# Patient Record
Sex: FEMALE | Race: WHITE | NOT HISPANIC OR LATINO | Employment: PART TIME | URBAN - METROPOLITAN AREA
[De-identification: names, ages, dates, MRNs, and addresses within clinical notes are randomized per-mention and may not be internally consistent; named-entity substitution may affect disease eponyms.]

---

## 2022-09-07 ENCOUNTER — HOSPITAL ENCOUNTER (EMERGENCY)
Facility: HOSPITAL | Age: 22
Discharge: HOME | End: 2022-09-07
Attending: STUDENT IN AN ORGANIZED HEALTH CARE EDUCATION/TRAINING PROGRAM | Admitting: STUDENT IN AN ORGANIZED HEALTH CARE EDUCATION/TRAINING PROGRAM

## 2022-09-07 VITALS
SYSTOLIC BLOOD PRESSURE: 127 MMHG | WEIGHT: 150 LBS | DIASTOLIC BLOOD PRESSURE: 81 MMHG | BODY MASS INDEX: 26.58 KG/M2 | HEART RATE: 95 BPM | RESPIRATION RATE: 14 BRPM | OXYGEN SATURATION: 98 % | HEIGHT: 63 IN | TEMPERATURE: 98.8 F

## 2022-09-07 DIAGNOSIS — M25.562 ACUTE PAIN OF LEFT KNEE: ICD-10-CM

## 2022-09-07 DIAGNOSIS — L02.416 ABSCESS OF SKIN OF LEFT KNEE: Primary | ICD-10-CM

## 2022-09-07 DIAGNOSIS — L03.116 CELLULITIS OF LEFT LOWER EXTREMITY: ICD-10-CM

## 2022-09-07 LAB
ANION GAP SERPL CALCULATED.3IONS-SCNC: 10 MMOL/L (ref 5–15)
BASOPHILS # BLD AUTO: 0.01 10*3/MM3 (ref 0–0.2)
BASOPHILS NFR BLD AUTO: 0.1 % (ref 0–1.5)
BUN SERPL-MCNC: 11 MG/DL (ref 6–20)
BUN/CREAT SERPL: 16.7 (ref 7–25)
CALCIUM SPEC-SCNC: 9.4 MG/DL (ref 8.6–10.5)
CHLORIDE SERPL-SCNC: 107 MMOL/L (ref 98–107)
CO2 SERPL-SCNC: 24 MMOL/L (ref 22–29)
CREAT SERPL-MCNC: 0.66 MG/DL (ref 0.57–1)
CRP SERPL-MCNC: 1.06 MG/DL (ref 0–0.5)
D-LACTATE SERPL-SCNC: 0.9 MMOL/L (ref 0.5–2)
DEPRECATED RDW RBC AUTO: 37.6 FL (ref 37–54)
EGFRCR SERPLBLD CKD-EPI 2021: 128.2 ML/MIN/1.73
EOSINOPHIL # BLD AUTO: 0.32 10*3/MM3 (ref 0–0.4)
EOSINOPHIL NFR BLD AUTO: 3.2 % (ref 0.3–6.2)
ERYTHROCYTE [DISTWIDTH] IN BLOOD BY AUTOMATED COUNT: 12.4 % (ref 12.3–15.4)
GLUCOSE SERPL-MCNC: 101 MG/DL (ref 65–99)
HCT VFR BLD AUTO: 37.9 % (ref 34–46.6)
HGB BLD-MCNC: 12.5 G/DL (ref 12–15.9)
IMM GRANULOCYTES # BLD AUTO: 0.04 10*3/MM3 (ref 0–0.05)
IMM GRANULOCYTES NFR BLD AUTO: 0.4 % (ref 0–0.5)
LYMPHOCYTES # BLD AUTO: 1.66 10*3/MM3 (ref 0.7–3.1)
LYMPHOCYTES NFR BLD AUTO: 16.7 % (ref 19.6–45.3)
MCH RBC QN AUTO: 27.5 PG (ref 26.6–33)
MCHC RBC AUTO-ENTMCNC: 33 G/DL (ref 31.5–35.7)
MCV RBC AUTO: 83.5 FL (ref 79–97)
MONOCYTES # BLD AUTO: 0.82 10*3/MM3 (ref 0.1–0.9)
MONOCYTES NFR BLD AUTO: 8.3 % (ref 5–12)
NEUTROPHILS NFR BLD AUTO: 7.07 10*3/MM3 (ref 1.7–7)
NEUTROPHILS NFR BLD AUTO: 71.3 % (ref 42.7–76)
NRBC BLD AUTO-RTO: 0 /100 WBC (ref 0–0.2)
PLATELET # BLD AUTO: 254 10*3/MM3 (ref 140–450)
PMV BLD AUTO: 11.4 FL (ref 6–12)
POTASSIUM SERPL-SCNC: 3.6 MMOL/L (ref 3.5–5.2)
RBC # BLD AUTO: 4.54 10*6/MM3 (ref 3.77–5.28)
SODIUM SERPL-SCNC: 141 MMOL/L (ref 136–145)
WBC NRBC COR # BLD: 9.92 10*3/MM3 (ref 3.4–10.8)

## 2022-09-07 PROCEDURE — 96365 THER/PROPH/DIAG IV INF INIT: CPT

## 2022-09-07 PROCEDURE — 87186 SC STD MICRODIL/AGAR DIL: CPT | Performed by: STUDENT IN AN ORGANIZED HEALTH CARE EDUCATION/TRAINING PROGRAM

## 2022-09-07 PROCEDURE — 87040 BLOOD CULTURE FOR BACTERIA: CPT | Performed by: STUDENT IN AN ORGANIZED HEALTH CARE EDUCATION/TRAINING PROGRAM

## 2022-09-07 PROCEDURE — 36415 COLL VENOUS BLD VENIPUNCTURE: CPT

## 2022-09-07 PROCEDURE — 25010000002 ONDANSETRON PER 1 MG: Performed by: STUDENT IN AN ORGANIZED HEALTH CARE EDUCATION/TRAINING PROGRAM

## 2022-09-07 PROCEDURE — 86140 C-REACTIVE PROTEIN: CPT | Performed by: STUDENT IN AN ORGANIZED HEALTH CARE EDUCATION/TRAINING PROGRAM

## 2022-09-07 PROCEDURE — 85025 COMPLETE CBC W/AUTO DIFF WBC: CPT | Performed by: STUDENT IN AN ORGANIZED HEALTH CARE EDUCATION/TRAINING PROGRAM

## 2022-09-07 PROCEDURE — 87147 CULTURE TYPE IMMUNOLOGIC: CPT | Performed by: STUDENT IN AN ORGANIZED HEALTH CARE EDUCATION/TRAINING PROGRAM

## 2022-09-07 PROCEDURE — 80048 BASIC METABOLIC PNL TOTAL CA: CPT | Performed by: STUDENT IN AN ORGANIZED HEALTH CARE EDUCATION/TRAINING PROGRAM

## 2022-09-07 PROCEDURE — 99283 EMERGENCY DEPT VISIT LOW MDM: CPT

## 2022-09-07 PROCEDURE — 87205 SMEAR GRAM STAIN: CPT | Performed by: STUDENT IN AN ORGANIZED HEALTH CARE EDUCATION/TRAINING PROGRAM

## 2022-09-07 PROCEDURE — 96375 TX/PRO/DX INJ NEW DRUG ADDON: CPT

## 2022-09-07 PROCEDURE — 87070 CULTURE OTHR SPECIMN AEROBIC: CPT | Performed by: STUDENT IN AN ORGANIZED HEALTH CARE EDUCATION/TRAINING PROGRAM

## 2022-09-07 PROCEDURE — 83605 ASSAY OF LACTIC ACID: CPT | Performed by: STUDENT IN AN ORGANIZED HEALTH CARE EDUCATION/TRAINING PROGRAM

## 2022-09-07 PROCEDURE — 25010000002 CEFTRIAXONE PER 250 MG: Performed by: STUDENT IN AN ORGANIZED HEALTH CARE EDUCATION/TRAINING PROGRAM

## 2022-09-07 RX ORDER — CLINDAMYCIN PHOSPHATE 600 MG/50ML
600 INJECTION INTRAVENOUS ONCE
Status: COMPLETED | OUTPATIENT
Start: 2022-09-07 | End: 2022-09-07

## 2022-09-07 RX ORDER — LIDOCAINE HYDROCHLORIDE 20 MG/ML
10 INJECTION, SOLUTION INFILTRATION; PERINEURAL ONCE
Status: COMPLETED | OUTPATIENT
Start: 2022-09-07 | End: 2022-09-07

## 2022-09-07 RX ORDER — CLINDAMYCIN HYDROCHLORIDE 300 MG/1
300 CAPSULE ORAL 4 TIMES DAILY
Qty: 28 CAPSULE | Refills: 0 | Status: SHIPPED | OUTPATIENT
Start: 2022-09-07 | End: 2022-09-14

## 2022-09-07 RX ORDER — ONDANSETRON 4 MG/1
4 TABLET, ORALLY DISINTEGRATING ORAL 4 TIMES DAILY PRN
Qty: 12 TABLET | Refills: 0 | Status: SHIPPED | OUTPATIENT
Start: 2022-09-07 | End: 2023-01-26

## 2022-09-07 RX ORDER — IBUPROFEN 600 MG/1
600 TABLET ORAL EVERY 6 HOURS PRN
Qty: 15 TABLET | Refills: 0 | OUTPATIENT
Start: 2022-09-07 | End: 2023-02-05

## 2022-09-07 RX ORDER — ONDANSETRON 2 MG/ML
4 INJECTION INTRAMUSCULAR; INTRAVENOUS ONCE
Status: COMPLETED | OUTPATIENT
Start: 2022-09-07 | End: 2022-09-07

## 2022-09-07 RX ORDER — IBUPROFEN 800 MG/1
800 TABLET ORAL ONCE
Status: COMPLETED | OUTPATIENT
Start: 2022-09-07 | End: 2022-09-07

## 2022-09-07 RX ADMIN — CLINDAMYCIN IN 5 PERCENT DEXTROSE 600 MG: 12 INJECTION, SOLUTION INTRAVENOUS at 06:53

## 2022-09-07 RX ADMIN — Medication 5 ML: at 06:18

## 2022-09-07 RX ADMIN — IBUPROFEN 800 MG: 800 TABLET, FILM COATED ORAL at 06:11

## 2022-09-07 RX ADMIN — SODIUM CHLORIDE 1 G: 900 INJECTION INTRAVENOUS at 06:16

## 2022-09-07 RX ADMIN — ONDANSETRON 4 MG: 2 INJECTION INTRAMUSCULAR; INTRAVENOUS at 06:33

## 2022-09-07 RX ADMIN — LIDOCAINE HYDROCHLORIDE 10 ML: 20 INJECTION, SOLUTION INFILTRATION; PERINEURAL at 06:57

## 2022-09-07 NOTE — DISCHARGE INSTRUCTIONS
Take the provided antibiotic as directed youshould start to see improvement in the redness in about 24 hours.  If you have worsening pain, fevers, spreading redness or any other concerning symptoms please return to the ED or seek other medical care.

## 2022-09-08 NOTE — ED PROVIDER NOTES
EMERGENCY DEPARTMENT ENCOUNTER    Pt Name: Yaneth Garcias  MRN: [id number]  Pt :   2000  Room Number:  1515  Date of encounter:  2022  PCP: Provider, No Known  ED Provider: Lyle Murphy MD    Historian: Patient      HPI:  Chief Complaint: Knee redness, spider bite        Context: Yaneth Garcias is a 21-year-old female who presents because of pain and drainage from left knee injury.  She says she first noticed symptoms about 24 hours ago and thinks she may have gotten bit by a spider.  Over the last 24 hours she has had worsening redness and tenderness over the area and is also noticed some purulent drainage.  She has been able to ambulate and denies any difficulty with flexing or extending her knee.  Denies fevers or systemic symptoms.  Currently rates her pain as moderate in its worse by palpating the area.  Pain has been progressively worsening with time.  Knows of no medications that makes it better.  Denies any other complaints at this time.    PAST MEDICAL HISTORY  No past medical history on file.      PAST SURGICAL HISTORY  No past surgical history on file.      FAMILY HISTORY  No family history on file.      SOCIAL HISTORY  Social History     Socioeconomic History   • Marital status: Single         ALLERGIES  Patient has no known allergies.        REVIEW OF SYSTEMS  Review of Systems       All systems reviewed and negative except for those discussed in HPI.       PHYSICAL EXAM    I have reviewed the triage vital signs and nursing notes.    ED Triage Vitals [22 0437]   Temp Heart Rate Resp BP SpO2   98.8 °F (37.1 °C) 95 14 127/81 98 %      Temp src Heart Rate Source Patient Position BP Location FiO2 (%)   Oral Monitor Sitting Left arm --       Physical Exam  GENERAL:   Appears awake and alert in no acute distress  HENT: Nares patent.  EYES: No scleral icterus.  CV: Regular rhythm, regular rate.  RESPIRATORY: Normal effort.  No audible wheezes, rales or rhonchi.  ABDOMEN: Soft,  nontender  MUSCULOSKELETAL: There is erythema overlying most of the patella with warmth and a small area of fluctuance just lateral to the midline appears to be a furuncle she is reporting possible spider bite.  Left knee has full range of passive motion and she is able to ambulate my concern for joint involvement is low.  NEURO: Alert, moves all extremities, follows commands.  SKIN: Warm, dry, no rash visualized.        LAB RESULTS  Recent Results (from the past 24 hour(s))   Basic Metabolic Panel    Collection Time: 09/07/22  5:26 AM    Specimen: Blood   Result Value Ref Range    Glucose 101 (H) 65 - 99 mg/dL    BUN 11 6 - 20 mg/dL    Creatinine 0.66 0.57 - 1.00 mg/dL    Sodium 141 136 - 145 mmol/L    Potassium 3.6 3.5 - 5.2 mmol/L    Chloride 107 98 - 107 mmol/L    CO2 24.0 22.0 - 29.0 mmol/L    Calcium 9.4 8.6 - 10.5 mg/dL    BUN/Creatinine Ratio 16.7 7.0 - 25.0    Anion Gap 10.0 5.0 - 15.0 mmol/L    eGFR 128.2 >60.0 mL/min/1.73   C-reactive Protein    Collection Time: 09/07/22  5:26 AM    Specimen: Blood   Result Value Ref Range    C-Reactive Protein 1.06 (H) 0.00 - 0.50 mg/dL   Lactic Acid, Plasma    Collection Time: 09/07/22  5:26 AM    Specimen: Blood   Result Value Ref Range    Lactate 0.9 0.5 - 2.0 mmol/L   CBC Auto Differential    Collection Time: 09/07/22  5:26 AM    Specimen: Blood   Result Value Ref Range    WBC 9.92 3.40 - 10.80 10*3/mm3    RBC 4.54 3.77 - 5.28 10*6/mm3    Hemoglobin 12.5 12.0 - 15.9 g/dL    Hematocrit 37.9 34.0 - 46.6 %    MCV 83.5 79.0 - 97.0 fL    MCH 27.5 26.6 - 33.0 pg    MCHC 33.0 31.5 - 35.7 g/dL    RDW 12.4 12.3 - 15.4 %    RDW-SD 37.6 37.0 - 54.0 fl    MPV 11.4 6.0 - 12.0 fL    Platelets 254 140 - 450 10*3/mm3    Neutrophil % 71.3 42.7 - 76.0 %    Lymphocyte % 16.7 (L) 19.6 - 45.3 %    Monocyte % 8.3 5.0 - 12.0 %    Eosinophil % 3.2 0.3 - 6.2 %    Basophil % 0.1 0.0 - 1.5 %    Immature Grans % 0.4 0.0 - 0.5 %    Neutrophils, Absolute 7.07 (H) 1.70 - 7.00 10*3/mm3     Lymphocytes, Absolute 1.66 0.70 - 3.10 10*3/mm3    Monocytes, Absolute 0.82 0.10 - 0.90 10*3/mm3    Eosinophils, Absolute 0.32 0.00 - 0.40 10*3/mm3    Basophils, Absolute 0.01 0.00 - 0.20 10*3/mm3    Immature Grans, Absolute 0.04 0.00 - 0.05 10*3/mm3    nRBC 0.0 0.0 - 0.2 /100 WBC   Wound Culture - Drainage, Leg, Left    Collection Time: 09/07/22  5:27 AM    Specimen: Leg, Left; Drainage   Result Value Ref Range    Gram Stain Moderate (3+) WBCs per low power field     Gram Stain Occasional Red blood cells     Gram Stain Occasional Epithelial cells per low power field     Gram Stain No organisms seen        If labs were ordered, I independently reviewed the results.        RADIOLOGY  No Radiology Exams Resulted Within Past 24 Hours    I ordered and reviewed the above noted radiographic studies.          See radiologist's dictation for official interpretation.        PROCEDURES    Incision & Drainage    Date/Time: 9/7/2022 8:38 PM  Performed by: Lyle Murphy MD  Authorized by: Lyle Murphy MD     Consent:     Consent obtained:  Verbal    Consent given by:  Patient    Risks, benefits, and alternatives were discussed: yes      Risks discussed:  Bleeding, incomplete drainage, pain, infection and damage to other organs    Alternatives discussed:  No treatment  Universal protocol:     Patient identity confirmed:  Verbally with patient  Location:     Type:  Abscess    Size:  Approximately 1 x 1 cm.    Location:  Lower extremity    Lower extremity location:  Knee (Directly overlying the patella.)    Knee location:  L knee  Pre-procedure details:     Skin preparation:  Chlorhexidine with alcohol  Anesthesia:     Anesthesia method:  Local infiltration and topical application    Topical anesthetic:  LET    Local anesthetic:  Lidocaine 1% w/o epi  Procedure type:     Complexity:  Simple  Procedure details:     Ultrasound guidance: yes      Incision types:  Stab incision    Incision depth:  Dermal    Wound  management:  Probed and deloculated and irrigated with saline    Drainage:  Purulent    Drainage amount:  Scant    Wound treatment:  Wound left open  Post-procedure details:     Procedure completion:  Tolerated well, no immediate complications        No orders to display       MEDICATIONS GIVEN IN ER    Medications   cefTRIAXone (ROCEPHIN) 1 g/100 mL 0.9% NS (MBP) (0 g Intravenous Stopped 9/7/22 0625)   Lido-EPINEPHrine-Tetracaine 4-0.18-0.5 % gel 5 mL (5 mL Topical Given 9/7/22 0618)   ibuprofen (ADVIL,MOTRIN) tablet 800 mg (800 mg Oral Given 9/7/22 0611)   ondansetron (ZOFRAN) injection 4 mg (4 mg Intravenous Given 9/7/22 0633)   clindamycin (CLEOCIN) 600 mg in dextrose 5% 50 mL IVPB (premix) (0 mg Intravenous Stopped 9/7/22 0734)   lidocaine (XYLOCAINE) 2% injection 10 mL (10 mL Injection Given by Other 9/7/22 0657)         PROGRESS, DATA ANALYSIS, CONSULTS, AND MEDICAL DECISION MAKING    All labs have been independently reviewed by me.  All radiology studies have been reviewed by me and the radiologist dictating the report.   EKG's have been independently viewed and interpreted by me.      Differential diagnoses: Septic arthritis, cellulitis, abscess, insect bite      ED Course as of 09/07/22 2035   Wed Sep 07, 2022   0713 In summary is a previously healthy 21-year-old female who presents because of pain and drainage from left knee injury.  She says she first noticed symptoms about 24 hours ago and thinks she may have gotten bit by a spider.  Over the last 24 hours she has had worsening redness and tenderness over the area and is also noticed some purulent drainage.  She has been able to ambulate and denies any difficulty with flexing or extending her knee.  Denies fevers or systemic symptoms.  Currently rates her pain as moderate in its worse by palpating the area.  Pain has been progressively worsening with time.  Knows of no medications that makes it better.  Denies any other complaints at this time. [CC]       ED Course User Index  [CC] Lyle Murphy MD       She arrived awake and alert vitals are within normal limits.  She has cellulitis with a small furuncle type abscess overlying the patella but does not appear to have joint involvement on the physical exam.  Topical let applied and discussed with her performing incision and drainage.  Wound cultures have been sent.  Point-of-care ultrasound does not show large drainable abscess but there is an obvious area of fluctuance will attempt drainage.  She required injected lidocaine for pain but then small stab incision was made and purulent drainage returned the area was irrigated with normal saline and wound cultures have been sent.  She was given 1 dose of clindamycin here in the emergency department and a prescription for clindamycin as well.  Labs do not reveal leukocytosis or elevation in lactate.  She does have mildly elevated CRP.  Wound cultures pending.  I counseled her that she needs to watch this closely the main concern would be this getting down into the joint and she will immediately return for any kind of fevers or systemic symptoms.  At 24 hours after initiation of antibiotic she will justin off the area of redness making sure that it is receding appropriately.  She will be contacted if wound cultures result positive.  Counseled to follow-up with her PCP.  Discharged in stable condition.      AS OF 20:35 EDT VITALS:    BP - 127/81  HR - 95  TEMP - 98.8 °F (37.1 °C) (Oral)  O2 SATS - 98%                  DIAGNOSIS  Final diagnoses:   Abscess of skin of left knee   Cellulitis of left lower extremity   Acute pain of left knee         DISPOSITION  DISCHARGE    Patient discharged in stable condition.    Reviewed implications of results, diagnosis, meds, responsibility to follow up, warning signs and symptoms of possible worsening, potential complications and reasons to return to ER.    Patient/Family voiced understanding of above  instructions.    Discussed plan for discharge, as there is no emergent indication for admission.  Pt/family is agreeable and understands need for follow up and possible repeat testing.  Pt/family is aware that discharge does not mean that nothing is wrong but that it indicates no emergency is currently present that requires admission and they must continue care with follow-up as given below or with a physician of their choice.     FOLLOW-UP  PATIENT CONNECTION - [place] [id number] [phone]  Call   If you need to establish with a PCP.         Medication List      New Prescriptions    clindamycin 300 MG capsule  Commonly known as: CLEOCIN  Take 1 capsule by mouth 4 (Four) Times a Day for 7 days.     ibuprofen 600 MG tablet  Commonly known as: ADVIL,MOTRIN  Take 1 tablet by mouth Every 6 (Six) Hours As Needed for Mild Pain.     ondansetron ODT 4 MG disintegrating tablet  Commonly known as: ZOFRAN-ODT  Place 1 tablet on the tongue 4 (Four) Times a Day As Needed for Nausea or Vomiting.           Where to Get Your Medications      These medications were sent to [facility] [place] - [facility] [address] - [phone]  - [phone]   [address] [place] [address]    Phone: [phone]   · clindamycin 300 MG capsule  · ibuprofen 600 MG tablet  · ondansetron ODT 4 MG disintegrating tablet                    Lyle Murphy MD  09/07/22 8140

## 2022-09-09 LAB
BACTERIA SPEC AEROBE CULT: ABNORMAL
GRAM STN SPEC: ABNORMAL

## 2022-09-12 LAB
BACTERIA SPEC AEROBE CULT: NORMAL
BACTERIA SPEC AEROBE CULT: NORMAL

## 2023-01-26 ENCOUNTER — OFFICE VISIT (OUTPATIENT)
Dept: OBSTETRICS AND GYNECOLOGY | Facility: CLINIC | Age: 23
End: 2023-01-26
Payer: COMMERCIAL

## 2023-01-26 ENCOUNTER — LAB (OUTPATIENT)
Dept: LAB | Facility: HOSPITAL | Age: 23
End: 2023-01-26
Payer: COMMERCIAL

## 2023-01-26 VITALS
HEIGHT: 63 IN | WEIGHT: 159 LBS | DIASTOLIC BLOOD PRESSURE: 60 MMHG | SYSTOLIC BLOOD PRESSURE: 110 MMHG | BODY MASS INDEX: 28.17 KG/M2

## 2023-01-26 DIAGNOSIS — Z01.419 ENCOUNTER FOR GYNECOLOGICAL EXAMINATION WITHOUT ABNORMAL FINDING: Primary | ICD-10-CM

## 2023-01-26 DIAGNOSIS — R30.0 DYSURIA: ICD-10-CM

## 2023-01-26 DIAGNOSIS — Z11.3 ROUTINE SCREENING FOR STI (SEXUALLY TRANSMITTED INFECTION): ICD-10-CM

## 2023-01-26 LAB
BACTERIA UR QL AUTO: ABNORMAL /HPF
BILIRUB UR QL STRIP: NEGATIVE
CLARITY UR: ABNORMAL
COLOR UR: YELLOW
GLUCOSE UR STRIP-MCNC: NEGATIVE MG/DL
HCV AB SER DONR QL: NORMAL
HGB UR QL STRIP.AUTO: ABNORMAL
HIV1+2 AB SER QL: NORMAL
HYALINE CASTS UR QL AUTO: ABNORMAL /LPF
KETONES UR QL STRIP: NEGATIVE
LEUKOCYTE ESTERASE UR QL STRIP.AUTO: ABNORMAL
NITRITE UR QL STRIP: NEGATIVE
PH UR STRIP.AUTO: 7 [PH] (ref 5–8)
PROT UR QL STRIP: NEGATIVE
RBC # UR STRIP: ABNORMAL /HPF
REF LAB TEST METHOD: ABNORMAL
RPR SER QL: NORMAL
SP GR UR STRIP: 1.01 (ref 1–1.03)
SQUAMOUS #/AREA URNS HPF: ABNORMAL /HPF
UROBILINOGEN UR QL STRIP: ABNORMAL
WBC # UR STRIP: ABNORMAL /HPF

## 2023-01-26 PROCEDURE — 87088 URINE BACTERIA CULTURE: CPT | Performed by: NURSE PRACTITIONER

## 2023-01-26 PROCEDURE — 3008F BODY MASS INDEX DOCD: CPT | Performed by: NURSE PRACTITIONER

## 2023-01-26 PROCEDURE — 81001 URINALYSIS AUTO W/SCOPE: CPT | Performed by: NURSE PRACTITIONER

## 2023-01-26 PROCEDURE — G0432 EIA HIV-1/HIV-2 SCREEN: HCPCS

## 2023-01-26 PROCEDURE — 87086 URINE CULTURE/COLONY COUNT: CPT | Performed by: NURSE PRACTITIONER

## 2023-01-26 PROCEDURE — 86803 HEPATITIS C AB TEST: CPT

## 2023-01-26 PROCEDURE — 86592 SYPHILIS TEST NON-TREP QUAL: CPT

## 2023-01-26 PROCEDURE — 87186 SC STD MICRODIL/AGAR DIL: CPT | Performed by: NURSE PRACTITIONER

## 2023-01-26 PROCEDURE — 99385 PREV VISIT NEW AGE 18-39: CPT | Performed by: NURSE PRACTITIONER

## 2023-01-26 NOTE — PROGRESS NOTES
Subjective   Chief Complaint   Patient presents with   • Gynecologic Exam     Well woman exam and family planning     Yaneth Garcias is a 22 y.o. year old  presenting to be seen for her annual exam.  She is concerned about emplaning the future as she had a miscarriage this .  She had an early pregnancy loss.  This occurred before she had established prenatal care.  She has had a previous full-term vaginal delivery in 2019.  She is not wanting to become pregnant now that is interested in pregnancy in the future.  Since her miscarriage her periods have become a little heavier with more clots.  She is concerned for possible uti, she is having burning and pain with urination. Her urine is darker in color as well.   SEXUAL Hx:  She is currently sexually active.  In the past year there there has been NO new sexual partners.    Condoms are never used.  She would  like to be screened for STD's at today's exam.  Current birth control method: condoms.  She is happy with her current method of contraception and does not want to discuss alternative methods of contraception.  MENSTRUAL Hx:  Patient's last menstrual period was 2023 (exact date).  In the past 6 months her cycles have been regular, predictable and occur monthly.  Her menstrual flow is typically normal. Since her miscarriage some heavier bleeding with clots  Each month on average there are roughly 2 day(s) of very heavy flow.  Her bleeding last 5-6 days  Intermenstrual bleeding is absent.    Post-coital bleeding is absent.  Dysmenorrhea: mild and is not affecting her activities of daily living  PMS: is not affecting her activities of daily living  Her cycles are not a source of concern for her that she wishes to discuss today.  HEALTH Hx:  She exercises regularly: yes.  She wears her seat belt: yes.  She has concerns about domestic violence: no.  OTHER THINGS SHE WANTS TO DISCUSS TODAY:  Nothing else    The following portions of the patient's  "history were reviewed and updated as appropriate:problem list, current medications, allergies, past family history, past medical history, past social history and past surgical history.    Social History    Tobacco Use      Smoking status: Never      Smokeless tobacco: Never    Review of Systems  Constitutional POS: nothing reported    NEG: anorexia or night sweats   Genitourinary POS: dysuria and started about three days ago    NEG: dysuria or hematuria      Gastointestinal POS: nothing reported    NEG: bloating, change in bowel habits, melena or reflux symptoms   Integument POS: nothing reported    NEG: moles that are changing in size, shape, color or rashes   Breast POS: history of mastitis in right breast with abcess, has some scarring, has had ultrasound for evaluation    NEG: persistent breast lump, skin dimpling or nipple discharge        Objective   /60 (BP Location: Right arm, Patient Position: Sitting, Cuff Size: Adult)   Ht 160 cm (63\")   Wt 72.1 kg (159 lb)   LMP 01/26/2023 (Exact Date)   BMI 28.17 kg/m²     General:  well developed; well nourished  no acute distress   Skin:  No suspicious lesions seen   Thyroid: normal to inspection and palpation   Breasts:  Examined in supine position  Symmetric without masses or skin dimpling  Nipples normal without inversion, lesions or discharge  There are no palpable axillary nodes  area at 2 o clock near areola consistent with reported site of previous breast abscess.   Abdomen: soft, non-tender; no masses  no umbilical or inguinal hernias are present  no hepato-splenomegaly   Pelvis: Clinical staff was present for exam  :  urethral meatus normal;  Vaginal:  normal pink mucosa without prolapse or lesions.  Cervix:  normal appearance.  Menstrual bleeding noted  Uterus:  normal size, shape and consistency.  Adnexa:  normal bimanual exam of the adnexa.  Rectal:  digital rectal exam not performed; anus visually normal appearing.        Assessment   1. Well " woman gynecological exam  2. STI screening  3. Dysuria     Plan     1. Pap and STD testing was done today.  If she does not receive the results of the Pap within 2 weeks  time, she was instructed to call to find out the results.  I explained to Yaneth that the recommendations for Pap smear interval in a low risk patient's has lengthened to 3 years time.  I encouraged her to be seen yearly for a full physical exam including breast and pelvic exam even during the off years when PAP's will not be performed.  Discussed with patient due to presence of menstrual bleeding may have to repeat Pap the specimen is inadequate   2. Lab order for STI blood work sent to pharmacy on file.  3. No prescription was given or electronically sent at today's visit   4. Discussed with patient as she has had a full-term pregnancy in the past would not be overly concerned about future fertility at this time.  Discussed miscarriage occurrence ,25% of pregnancies.  If recurrent miscarriages, needing more than 3 would consider further work-up.  However she is not interested in pregnancy this time discussed faithful condom use..  5. The importance of keeping all planned follow-up and taking all medications as prescribed was emphasized.  6. UA sent to lab for dysuria.  7. Today I discussed with Noejean the total recommended calcium intake for a premenopausal female is 1000 mg.  Ideally this should be from dietary sources.  I reviewed calcium content in various foods including milk, fortified orange juice and yogurt.  If she cannot get sufficient calcium through dietary means, it is recommended to supplement with either a multivitamin or calcium to reach her daily goal.  I also reviewed the difference in the bioavailability of calcium carbonate and calcium citrate containing supplements and the importance of taking calcium carbonate containing products with food.  Finally, vitamin D's role in calcium absorption was reviewed and a total daily  vitamin D intake of 800 units was recommended.  8. She does think she has had her HPV vaccines however she will check with her pediatrician and we will update her immunization status.  If she has not had her vaccines encouraged vaccination.  9. Follow up for annual exam 1 year or prn    No orders of the defined types were placed in this encounter.         This note was electronically signed.    Koki Gerard, MARK  January 26, 2023

## 2023-01-27 RX ORDER — SULFAMETHOXAZOLE AND TRIMETHOPRIM 800; 160 MG/1; MG/1
1 TABLET ORAL 2 TIMES DAILY
Qty: 6 TABLET | Refills: 0 | OUTPATIENT
Start: 2023-01-27 | End: 2023-02-05

## 2023-01-29 LAB — BACTERIA SPEC AEROBE CULT: ABNORMAL

## 2023-02-01 LAB — REF LAB TEST METHOD: NORMAL

## 2023-02-05 ENCOUNTER — HOSPITAL ENCOUNTER (EMERGENCY)
Facility: HOSPITAL | Age: 23
Discharge: HOME | End: 2023-02-05
Attending: EMERGENCY MEDICINE | Admitting: EMERGENCY MEDICINE
Payer: COMMERCIAL

## 2023-02-05 VITALS
DIASTOLIC BLOOD PRESSURE: 68 MMHG | HEART RATE: 78 BPM | BODY MASS INDEX: 25.07 KG/M2 | TEMPERATURE: 98 F | RESPIRATION RATE: 16 BRPM | WEIGHT: 156 LBS | SYSTOLIC BLOOD PRESSURE: 104 MMHG | OXYGEN SATURATION: 100 % | HEIGHT: 66 IN

## 2023-02-05 DIAGNOSIS — K13.79 ORAL PAIN: ICD-10-CM

## 2023-02-05 DIAGNOSIS — K12.0 APHTHOUS ULCER OF MOUTH: Primary | ICD-10-CM

## 2023-02-05 PROCEDURE — 99282 EMERGENCY DEPT VISIT SF MDM: CPT

## 2023-02-05 RX ORDER — LIDOCAINE HYDROCHLORIDE 20 MG/ML
10 SOLUTION OROPHARYNGEAL
Status: DISCONTINUED | OUTPATIENT
Start: 2023-02-05 | End: 2023-02-05 | Stop reason: HOSPADM

## 2023-02-05 RX ADMIN — TOPICAL ANESTHETIC 1 SPRAY: 200 SPRAY DENTAL; PERIODONTAL at 02:42

## 2023-02-05 RX ADMIN — LIDOCAINE HYDROCHLORIDE 10 ML: 20 SOLUTION ORAL; TOPICAL at 02:42

## 2023-02-05 NOTE — ED PROVIDER NOTES
"Subjective   History of Present Illness  This is a 22-year-old female that presents the ER with painful oral lesion just inside the upper lip x4 to 5 days.  She reports another lesion to the outer lower lip that started approximately 8 to 9 days ago.  She was seen at [facility] and they diagnosed her with a \"cold sore\".  They gave her viscous lidocaine and 2-day course of Valtrex.  Patient denies personal history of herpes simplex type I.  She denies any other oral lesions or sore throat or difficulty swallowing.  She denies fever or chills.  She denies any recent URI symptoms including rhinorrhea, nasal congestion, or cough.  Past medical history is negative.    History provided by:  Patient  Mouth Lesions  Location:  Upper lip  Upper lip location:  R inner  Quality:  Ulcerous  Duration:  1 week  Progression:  Worsening  Chronicity:  Recurrent (Patient had another lesion to the lower left lip that is healing)  Context: stress    Context: not a change in diet, not a change in medications, not medications, not a possible infection and not trauma    Relieved by:  Nothing  Worsened by:  Nothing  Ineffective treatments: Patient prescribed viscous lidocaine and 2-day course of Valtrex per [facility]  a little over 1 week ago.  Associated symptoms: no congestion, no dental pain, no ear pain, no fever, no malaise, no neck pain, no rash, no rhinorrhea, no sore throat and no swollen glands        Review of Systems   Constitutional: Negative.  Negative for activity change, appetite change, chills, diaphoresis, fatigue and fever.   HENT: Positive for mouth sores. Negative for congestion, dental problem, ear pain, postnasal drip, rhinorrhea, sinus pressure, sinus pain, sneezing, sore throat, trouble swallowing and voice change.         Painful ulcer noted to the upper inner lip on the right.  No other oral lesions.  Recent painful lesion to the lower outer lip, healing now.   Respiratory: Negative.  Negative for cough and shortness of " breath.    Cardiovascular: Negative.    Gastrointestinal: Negative.  Negative for abdominal pain, constipation, diarrhea, nausea and vomiting.   Genitourinary: Negative.    Musculoskeletal: Negative.  Negative for back pain, gait problem, joint swelling and neck pain.   Skin: Negative.  Negative for color change and rash.   Neurological: Negative.  Negative for dizziness, syncope, light-headedness and headaches.   All other systems reviewed and are negative.      History reviewed. No pertinent past medical history.    No Known Allergies    Past Surgical History:   Procedure Laterality Date   • TONSILLECTOMY         Family History   Problem Relation Age of Onset   • Diabetes Maternal Grandmother    • Hypertension Maternal Grandfather    • Diabetes Maternal Grandfather    • Breast cancer Maternal Great-Grandmother        Social History     Socioeconomic History   • Marital status: Single   • Number of children: 1   • Highest education level: Bachelor's degree (e.g., BA, AB, BS)   Tobacco Use   • Smoking status: Never   • Smokeless tobacco: Never   Vaping Use   • Vaping Use: Never used   Substance and Sexual Activity   • Alcohol use: Never   • Drug use: Never   • Sexual activity: Yes     Partners: Male           Objective   Physical Exam  Vitals and nursing note reviewed.   Constitutional:       General: She is not in acute distress.     Appearance: Normal appearance. She is not ill-appearing, toxic-appearing or diaphoretic.      Comments: Patient resting comfortably.  No acute sign of pain or distress.  Nontoxic.   HENT:      Head: Normocephalic and atraumatic.      Nose: Nose normal. No congestion or rhinorrhea.      Mouth/Throat:      Mouth: Mucous membranes are moist. Oral lesions present.      Dentition: Normal dentition. No dental tenderness, gingival swelling or dental caries.      Pharynx: Oropharynx is clear. No pharyngeal swelling, oropharyngeal exudate, posterior oropharyngeal erythema or uvula swelling.       Comments: Patient has an ulcer noted to the right upper inner lip.  No other oral lesions.  Posterior pharynx is clear and nonerythematous.  No exudate.  Dentition looks good.  No dental caries or tenderness.  Eyes:      Extraocular Movements: Extraocular movements intact.      Conjunctiva/sclera: Conjunctivae normal.      Pupils: Pupils are equal, round, and reactive to light.   Neck:      Comments: No cervical lymphadenopathy  Cardiovascular:      Rate and Rhythm: Normal rate and regular rhythm.  No extrasystoles are present.     Pulses: Normal pulses.      Heart sounds: Normal heart sounds.   Pulmonary:      Effort: Pulmonary effort is normal.      Breath sounds: Normal breath sounds.   Abdominal:      General: Bowel sounds are normal.      Palpations: Abdomen is soft.   Musculoskeletal:         General: Normal range of motion.      Cervical back: Normal range of motion and neck supple.   Lymphadenopathy:      Cervical: No cervical adenopathy.   Skin:     General: Skin is warm and dry.      Findings: No rash.      Comments: No skin lesions or rash noted.  See HEENT for details on oral lesion x1.   Neurological:      General: No focal deficit present.      Mental Status: She is alert.         Procedures           ED Course  ED Course as of 02/05/23 0230   Sun Feb 05, 2023   0226 Oral exam is consistent with an aphthous ulcer to the right upper inner lip.  No other oral lesions and no erythema, vesicles, or exudate to the posterior pharynx.  No cervical lymphadenopathy.  Vital signs and exam are stable.  I reassured patient and we sent patient home with some tooth balls with Hurricane spray and viscous lidocaine to use topically as needed for oral discomfort.  Patient may follow-up closely with PCP.  We will give her follow-up phone number for patient connection to establish care with PCP in the [place] area.  She verbalized understanding and is ready for discharge to home. [FC]      ED Course User Index  [FC]  "Bernarda Venegas PA-C      No results found for this or any previous visit (from the past 24 hour(s)).  Note: In addition to lab results from this visit, the labs listed above may include labs taken at another facility or during a different encounter within the last 24 hours. Please correlate lab times with ED admission and discharge times for further clarification of the services performed during this visit.    No orders to display     Vitals:    02/05/23 0028   BP: 120/74   BP Location: Right arm   Patient Position: Sitting   Pulse: 77   Resp: 16   Temp: 98 °F (36.7 °C)   TempSrc: Oral   SpO2: 98%   Weight: 70.8 kg (156 lb)   Height: 167.6 cm (66\")     Medications   benzocaine (HURRICAINE) 20 % liquid solution 1 spray (has no administration in time range)   Lidocaine Viscous HCl (XYLOCAINE) 2 % solution 10 mL (has no administration in time range)     ECG/EMG Results (last 24 hours)     ** No results found for the last 24 hours. **        No orders to display                                            MDM    Final diagnoses:   Aphthous ulcer of mouth   Oral pain       ED Disposition  ED Disposition     ED Disposition   Discharge    Condition   Stable    Comment   --             PATIENT CONNECTION - [place] [address]  [phone]  Call in 2 days  Call Monday to find out accepting physicians in the [place] area to establish care with    [facility] Emergency Department  [facility] [place] [id number] [phone]    If symptoms worsen         Medication List      Stop    ibuprofen 600 MG tablet  Commonly known as: ADVIL,MOTRIN     sulfamethoxazole-trimethoprim 800-160 MG per tablet  Commonly known as: Bactrim DS             Bernarda Venegas PA-C  02/05/23 0230    "

## 2023-02-05 NOTE — DISCHARGE INSTRUCTIONS
Patient has an aphthous ulcer to the right upper inner lip.  We provided tooth balls to use as needed for topical discomfort.  Patient may also take ibuprofen every 6 hours as needed for pain.  We gave follow-up information for patient connection.  Call the number and she can find out accepting physicians in the [place] area.  No need for any other treatment.  Return to the ER if worsening symptoms.  Patient also recommended to take a daily multivitamin.

## 2023-06-08 ENCOUNTER — INITIAL PRENATAL (OUTPATIENT)
Dept: OBSTETRICS AND GYNECOLOGY | Facility: CLINIC | Age: 23
End: 2023-06-08
Payer: COMMERCIAL

## 2023-06-08 ENCOUNTER — LAB (OUTPATIENT)
Dept: LAB | Facility: HOSPITAL | Age: 23
End: 2023-06-08
Payer: COMMERCIAL

## 2023-06-08 VITALS — WEIGHT: 162 LBS | DIASTOLIC BLOOD PRESSURE: 70 MMHG | SYSTOLIC BLOOD PRESSURE: 110 MMHG | BODY MASS INDEX: 26.15 KG/M2

## 2023-06-08 DIAGNOSIS — Z82.79 FAMILY HISTORY OF CLEFT LIP: ICD-10-CM

## 2023-06-08 DIAGNOSIS — Z34.81 PRENATAL CARE, SUBSEQUENT PREGNANCY IN FIRST TRIMESTER: ICD-10-CM

## 2023-06-08 DIAGNOSIS — Z34.81 PRENATAL CARE, SUBSEQUENT PREGNANCY IN FIRST TRIMESTER: Primary | ICD-10-CM

## 2023-06-08 DIAGNOSIS — Z82.79 FAMILY HISTORY OF CONGENITAL HEART DISEASE: ICD-10-CM

## 2023-06-08 LAB
ABO GROUP BLD: NORMAL
AMPHET+METHAMPHET UR QL: NEGATIVE
AMPHETAMINES UR QL: NEGATIVE
BARBITURATES UR QL SCN: NEGATIVE
BASOPHILS # BLD AUTO: 0.01 10*3/MM3 (ref 0–0.2)
BASOPHILS NFR BLD AUTO: 0.1 % (ref 0–1.5)
BENZODIAZ UR QL SCN: NEGATIVE
BLD GP AB SCN SERPL QL: NEGATIVE
BUPRENORPHINE SERPL-MCNC: NEGATIVE NG/ML
CANNABINOIDS SERPL QL: NEGATIVE
COCAINE UR QL: NEGATIVE
DEPRECATED RDW RBC AUTO: 40.8 FL (ref 37–54)
EOSINOPHIL # BLD AUTO: 0.07 10*3/MM3 (ref 0–0.4)
EOSINOPHIL NFR BLD AUTO: 0.9 % (ref 0.3–6.2)
ERYTHROCYTE [DISTWIDTH] IN BLOOD BY AUTOMATED COUNT: 14.4 % (ref 12.3–15.4)
FENTANYL UR-MCNC: NEGATIVE NG/ML
HBV SURFACE AG SERPL QL IA: NORMAL
HCT VFR BLD AUTO: 38.2 % (ref 34–46.6)
HCV AB SER DONR QL: NORMAL
HGB BLD-MCNC: 12.8 G/DL (ref 12–15.9)
HIV1+2 AB SER QL: NORMAL
IMM GRANULOCYTES # BLD AUTO: 0.02 10*3/MM3 (ref 0–0.05)
IMM GRANULOCYTES NFR BLD AUTO: 0.3 % (ref 0–0.5)
LYMPHOCYTES # BLD AUTO: 1.66 10*3/MM3 (ref 0.7–3.1)
LYMPHOCYTES NFR BLD AUTO: 22 % (ref 19.6–45.3)
MCH RBC QN AUTO: 26.6 PG (ref 26.6–33)
MCHC RBC AUTO-ENTMCNC: 33.5 G/DL (ref 31.5–35.7)
MCV RBC AUTO: 79.3 FL (ref 79–97)
METHADONE UR QL SCN: NEGATIVE
MONOCYTES # BLD AUTO: 0.46 10*3/MM3 (ref 0.1–0.9)
MONOCYTES NFR BLD AUTO: 6.1 % (ref 5–12)
NEUTROPHILS NFR BLD AUTO: 5.33 10*3/MM3 (ref 1.7–7)
NEUTROPHILS NFR BLD AUTO: 70.6 % (ref 42.7–76)
NRBC BLD AUTO-RTO: 0 /100 WBC (ref 0–0.2)
OPIATES UR QL: NEGATIVE
OXYCODONE UR QL SCN: NEGATIVE
PCP UR QL SCN: NEGATIVE
PLATELET # BLD AUTO: 257 10*3/MM3 (ref 140–450)
PMV BLD AUTO: 12.1 FL (ref 6–12)
PROPOXYPH UR QL: NEGATIVE
RBC # BLD AUTO: 4.82 10*6/MM3 (ref 3.77–5.28)
RH BLD: POSITIVE
RPR SER QL: NORMAL
TRICYCLICS UR QL SCN: NEGATIVE
WBC NRBC COR # BLD: 7.55 10*3/MM3 (ref 3.4–10.8)

## 2023-06-08 PROCEDURE — 80307 DRUG TEST PRSMV CHEM ANLYZR: CPT | Performed by: OBSTETRICS & GYNECOLOGY

## 2023-06-08 PROCEDURE — 87491 CHLMYD TRACH DNA AMP PROBE: CPT | Performed by: OBSTETRICS & GYNECOLOGY

## 2023-06-08 PROCEDURE — 87591 N.GONORRHOEAE DNA AMP PROB: CPT | Performed by: OBSTETRICS & GYNECOLOGY

## 2023-06-08 PROCEDURE — 80081 OBSTETRIC PANEL INC HIV TSTG: CPT

## 2023-06-08 PROCEDURE — 86803 HEPATITIS C AB TEST: CPT

## 2023-06-08 PROCEDURE — 87086 URINE CULTURE/COLONY COUNT: CPT | Performed by: OBSTETRICS & GYNECOLOGY

## 2023-06-08 RX ORDER — PROMETHAZINE HYDROCHLORIDE 12.5 MG/1
TABLET ORAL
COMMUNITY
Start: 2023-04-26 | End: 2023-06-08

## 2023-06-08 RX ORDER — METOCLOPRAMIDE 10 MG/1
TABLET ORAL
COMMUNITY
Start: 2023-05-30 | End: 2023-06-08

## 2023-06-08 RX ORDER — DIPHENHYDRAMINE HYDROCHLORIDE 25 MG/1
CAPSULE ORAL
COMMUNITY
Start: 2023-04-26

## 2023-06-08 NOTE — PROGRESS NOTES
Subjective   Chief Complaint   Patient presents with    Initial Prenatal Visit     US done today       Yaneth Garcias is a 22 y.o. year old .  Patient's last menstrual period was 2023.  She presents to be seen to initiate prenatal care.  She reports seeing limits here in [place] about a week after May 25 due to some vaginal bleeding that lasted only 1 day.  She reports they noticed a subchorionic hemorrhage.  She denies any bleeding since then.  She reports they gave her the same due date of .  She currently is not having significant issues with nausea or vomiting.  She does report some discomfort in the vaginal area with sitting for prolonged periods of time.  She denies any rashes discharge itching or lesions. FOB is not really involved. She is here with her son Panda and her sister Jacquelyn.     Social History    Tobacco Use      Smoking status: Never      Smokeless tobacco: Never      The following portions of the patient's history were reviewed and updated as appropriate:vital signs, allergies, current medications, past medical history, past social history, past surgical history, and problem list.    Objective   /70   Wt 73.5 kg (162 lb)   LMP 2023   BMI 26.15 kg/m²     General: well developed; well nourished  no acute distress   Skin: Not performed.   Thyroid: not examined   Heart:  Not performed.   Lungs: breathing is unlabored   Breasts:  Not performed.   Abdomen: Not performed.   Pelvis: Not performed.       Lab Review   No data reviewed    Imaging   Pelvic ultrasound report    Assessment & Plan   ASSESSMENT  22 y.o. year old  at 10w4d confirmed by u/s today   Supervision of low risk pregnancy  History of LUANNE    PLAN  The problem list for pregnancy was initiated today  Tests ordered today:  Orders Placed This Encounter   Procedures    Urine Culture - Urine, Urine, Clean Catch     Standing Status:   Future     Number of Occurrences:   1     Standing Expiration  Date:   6/7/2024    Chlamydia trachomatis, Neisseria gonorrhoeae, PCR w/ confirmation - , Urinary Bladder     Standing Status:   Future     Number of Occurrences:   1     Standing Expiration Date:   6/8/2024     Order Specific Question:   Release to patient     Answer:   Routine Release    US Ob 14 + Weeks Single or First Gestation     Standing Status:   Future     Standing Expiration Date:   6/7/2024     Order Specific Question:   Reason for Exam:     Answer:   Anatomic screening    OB Panel With HIV     Standing Status:   Future     Standing Expiration Date:   6/7/2024    Hepatitis C Antibody     Standing Status:   Future     Standing Expiration Date:   6/7/2024    Urine Drug Screen - Urine, Clean Catch     Please confirm all positive UDS     Standing Status:   Future     Number of Occurrences:   1     Standing Expiration Date:   6/7/2024     Testing for GC / Chlamydia  was done today from urine  Pap was not done today.  I explained to Yaneth that the recommendations for Pap smear interval in a low risk patient has lengthened to 3 years time.  I told Yaneth she still needs to be seen in our office yearly for a full physical including breast and pelvic exam.  Genetic testing reviewed: NIPT (Panorama), carrier screening (CF and SMA), and she has considered the information and she is not interested in having genetic testing performed.  Information reviewed: exercise in pregnancy, nutrition in pregnancy, weight gain in pregnancy, work and travel restrictions during pregnancy, list of OTC medications acceptable in pregnancy, and call coverage groups  Request OB records including ultrasounds from [facility] [place]     Total time spent today with Yaneth  was 30-39 minutes (level 4).  Off this time, > 50% was spent face-to-face time coordinating care, answering her questions and counseling regarding exercise in pregnancy, nutrition in pregnancy, weight gain in pregnancy, work and travel restrictions during pregnancy, list  of OTC medications acceptable in pregnancy and call coverage groups and pathophysiology of her presenting problem along with plans for any diagnositc work-up and treatment.    Follow up: 5 week(s)       This note was electronically signed.    Juli Esquivel MD  June 8, 2023

## 2023-06-09 LAB
BACTERIA SPEC AEROBE CULT: ABNORMAL
RUBV IGG SERPL IA-ACNC: 1.18 INDEX

## 2023-06-10 ENCOUNTER — TELEPHONE (OUTPATIENT)
Dept: OBSTETRICS AND GYNECOLOGY | Facility: CLINIC | Age: 23
End: 2023-06-10
Payer: COMMERCIAL

## 2023-06-10 PROBLEM — O23.41 URINARY TRACT INFECTION IN MOTHER DURING FIRST TRIMESTER OF PREGNANCY: Status: ACTIVE | Noted: 2023-06-10

## 2023-06-10 RX ORDER — FLUCONAZOLE 150 MG/1
150 TABLET ORAL ONCE
Qty: 1 TABLET | Refills: 0 | Status: SHIPPED | OUTPATIENT
Start: 2023-06-10 | End: 2023-06-10

## 2023-06-10 NOTE — TELEPHONE ENCOUNTER
My chart message sent for yeast + on UTI.  New Medications Ordered This Visit   Medications    fluconazole (Diflucan) 150 MG tablet     Sig: Take 1 tablet by mouth 1 (One) Time for 1 dose.     Dispense:  1 tablet     Refill:  0     Juli Esquivel MD

## 2023-06-12 ENCOUNTER — TELEPHONE (OUTPATIENT)
Dept: OBSTETRICS AND GYNECOLOGY | Facility: CLINIC | Age: 23
End: 2023-06-12
Payer: COMMERCIAL

## 2023-06-12 NOTE — TELEPHONE ENCOUNTER
Caller: Yaneth Garcias    Relationship: Self    Best call back number: [phone]    Do you know the name of the person who called: TAMMY    What was the call regarding: PT RETURNING A MISSED CALL

## 2023-06-14 LAB
C TRACH RRNA SPEC QL NAA+PROBE: NEGATIVE
N GONORRHOEA RRNA SPEC QL NAA+PROBE: NEGATIVE

## 2023-07-13 PROBLEM — O99.891 BACK PAIN IN PREGNANCY: Status: ACTIVE | Noted: 2023-07-13

## 2023-07-13 PROBLEM — M54.9 BACK PAIN IN PREGNANCY: Status: ACTIVE | Noted: 2023-07-13

## 2023-07-13 PROBLEM — Z34.82 PRENATAL CARE, SUBSEQUENT PREGNANCY IN SECOND TRIMESTER: Status: ACTIVE | Noted: 2023-06-08

## 2023-07-22 ENCOUNTER — HOSPITAL ENCOUNTER (EMERGENCY)
Facility: HOSPITAL | Age: 23
Discharge: HOME | End: 2023-07-22
Attending: EMERGENCY MEDICINE | Admitting: EMERGENCY MEDICINE
Payer: COMMERCIAL

## 2023-07-22 VITALS
DIASTOLIC BLOOD PRESSURE: 84 MMHG | OXYGEN SATURATION: 99 % | SYSTOLIC BLOOD PRESSURE: 102 MMHG | HEIGHT: 63 IN | HEART RATE: 87 BPM | BODY MASS INDEX: 29.06 KG/M2 | RESPIRATION RATE: 19 BRPM | WEIGHT: 164 LBS | TEMPERATURE: 98.2 F

## 2023-07-22 DIAGNOSIS — L03.116 CELLULITIS OF LEFT LOWER LEG: Primary | ICD-10-CM

## 2023-07-22 PROCEDURE — 99283 EMERGENCY DEPT VISIT LOW MDM: CPT

## 2023-07-22 RX ORDER — CEPHALEXIN 500 MG/1
500 CAPSULE ORAL 4 TIMES DAILY
Qty: 28 CAPSULE | Refills: 0 | Status: SHIPPED | OUTPATIENT
Start: 2023-07-22 | End: 2023-07-29

## 2023-07-22 NOTE — ED PROVIDER NOTES
"Subjective   History of Present Illness  22-year-old female presents for evaluation of \"abscess.\"  Of note, the patient is a  at approximately 16 weeks gestation.  She tells me that about 5 days ago she began experiencing pain, redness, and swelling to her left popliteal region just posterior to her knee.  She notes that her symptoms have gradually worsened since that time, prompting her visit to the ED this morning.  She notes that she has had abscesses in the past requiring incision and drainage.  She denies any fevers, chills, or systemic symptoms.  She denies any drainage from the area.    Review of Systems   Skin:  Positive for color change.   All other systems reviewed and are negative.    No past medical history on file.    No Known Allergies    Past Surgical History:   Procedure Laterality Date    TONSILLECTOMY         Family History   Problem Relation Age of Onset    Diabetes Maternal Grandmother     Breast cancer Maternal Grandmother     Hypertension Maternal Grandfather     Diabetes Maternal Grandfather     Breast cancer Maternal Great-Grandmother     Colon cancer Neg Hx     Ovarian cancer Neg Hx     Pancreatic cancer Neg Hx     Prostate cancer Neg Hx        Social History     Socioeconomic History    Marital status:     Number of children: 1    Highest education level: Bachelor's degree (e.g., BA, AB, BS)   Tobacco Use    Smoking status: Never    Smokeless tobacco: Never   Vaping Use    Vaping Use: Never used   Substance and Sexual Activity    Alcohol use: Never    Drug use: Never    Sexual activity: Yes     Partners: Male           Objective   Physical Exam  Vitals and nursing note reviewed.   Constitutional:       General: She is not in acute distress.     Appearance: She is well-developed. She is not diaphoretic.      Comments: Well-appearing female in no acute distress   HENT:      Head: Normocephalic and atraumatic.      Comments: No mucous membrane lesions present  Cardiovascular:      " "Rate and Rhythm: Normal rate and regular rhythm.      Heart sounds: Normal heart sounds. No murmur heard.    No friction rub. No gallop.   Pulmonary:      Effort: Pulmonary effort is normal. No respiratory distress.      Breath sounds: Normal breath sounds. No wheezing or rales.   Abdominal:      General: Bowel sounds are normal. There is no distension.      Palpations: Abdomen is soft. There is no mass.      Tenderness: There is no abdominal tenderness. There is no guarding or rebound.   Musculoskeletal:         General: Normal range of motion.   Skin:     Comments: Tender, warm, erythema noted to left popliteal region, no palpable crepitus, central papular lesion noted expressing a scant amount of pus with firm palpation, no pain out of proportion to exam, negative Nikolsky sign   Neurological:      Mental Status: She is alert and oriented to person, place, and time.   Psychiatric:         Mood and Affect: Mood normal.         Thought Content: Thought content normal.         Judgment: Judgment normal.       Procedures           ED Course  ED Course as of 23 0644   Sat 2023   0634 22-year-old female presents for evaluation of \"abscess.\"  Of note, the patient is a  at approximately 16 weeks gestation.  She tells me that about 5 days ago she began experiencing pain, redness, and swelling to her popliteal region just posterior to her left knee.  Her symptoms have gradually worsened over the past 5 days, prompting her visit to the emergency department this morning.  She denies any fevers, chills, or systemic symptoms.  On exam, the patient has tender, warm, erythema noted to her left popliteal region.  There is a small central papule present.  No crepitus.  No pain out of proportion to exam.  I was able to express a scant amount of pus from this area with gentle palpation.  I then performed a bedside ultrasound which revealed cobblestoning but no drainable fluid collection or abscess amenable to " "incision and drainage. [DD]   0865 Patient reassured and counseled regarding symptomatic management.  Prescription for Keflex.  She will follow-up with her primary care physician within the next week.  Agreeable with plan and given appropriate strict return precautions. [DD]      ED Course User Index  [DD] Jef Olmedo MD                                    No results found for this or any previous visit (from the past 24 hour(s)).  Note: In addition to lab results from this visit, the labs listed above may include labs taken at another facility or during a different encounter within the last 24 hours. Please correlate lab times with ED admission and discharge times for further clarification of the services performed during this visit.    No orders to display     Vitals:    07/22/23 0600   BP: 102/84   BP Location: Left arm   Patient Position: Sitting   Pulse: 87   Resp: 19   Temp: 98.2 °F (36.8 °C)   TempSrc: Oral   SpO2: 99%   Weight: 74.4 kg (164 lb)   Height: 160 cm (63\")     Medications - No data to display  ECG/EMG Results (last 24 hours)       ** No results found for the last 24 hours. **          No orders to display              Medical Decision Making  Problems Addressed:  Cellulitis of left lower leg: complicated acute illness or injury    Risk  Prescription drug management.        Final diagnoses:   Cellulitis of left lower leg       ED Disposition  ED Disposition       ED Disposition   Discharge    Condition   Stable    Comment   --               PATIENT CONNECTION - [place] [id number]  [phone]  In 1 week           Medication List        New Prescriptions      cephalexin 500 MG capsule  Commonly known as: KEFLEX  Take 1 capsule by mouth 4 (Four) Times a Day for 7 days.               Where to Get Your Medications        These medications were sent to [facility] [id number] - [place], [place] - [address] - [phone] [facility] [phone] FX  [address] [address]IERREZSt[facility] [address]      Phone: [phone]   cephalexin 500 MG capsule            Jef Olmedo MD  07/22/23 8857

## 2023-08-17 ENCOUNTER — HOSPITAL ENCOUNTER (OUTPATIENT)
Dept: WOMENS IMAGING | Facility: HOSPITAL | Age: 23
Discharge: HOME | End: 2023-08-17
Admitting: OBSTETRICS & GYNECOLOGY
Payer: COMMERCIAL

## 2023-08-17 ENCOUNTER — OFFICE VISIT (OUTPATIENT)
Dept: OBSTETRICS AND GYNECOLOGY | Facility: HOSPITAL | Age: 23
End: 2023-08-17
Payer: COMMERCIAL

## 2023-08-17 ENCOUNTER — ROUTINE PRENATAL (OUTPATIENT)
Dept: OBSTETRICS AND GYNECOLOGY | Facility: CLINIC | Age: 23
End: 2023-08-17
Payer: COMMERCIAL

## 2023-08-17 VITALS — BODY MASS INDEX: 29.65 KG/M2 | SYSTOLIC BLOOD PRESSURE: 101 MMHG | WEIGHT: 167.4 LBS | DIASTOLIC BLOOD PRESSURE: 61 MMHG

## 2023-08-17 VITALS — BODY MASS INDEX: 29.65 KG/M2 | WEIGHT: 167.4 LBS

## 2023-08-17 DIAGNOSIS — Z34.82 PRENATAL CARE, SUBSEQUENT PREGNANCY IN SECOND TRIMESTER: Primary | ICD-10-CM

## 2023-08-17 DIAGNOSIS — Z82.79 FAMILY HISTORY OF CONGENITAL HEART DISEASE: ICD-10-CM

## 2023-08-17 DIAGNOSIS — M54.9 BACK PAIN IN PREGNANCY: ICD-10-CM

## 2023-08-17 DIAGNOSIS — Z82.79 FAMILY HISTORY OF CLEFT LIP: ICD-10-CM

## 2023-08-17 DIAGNOSIS — O23.41 URINARY TRACT INFECTION IN MOTHER DURING FIRST TRIMESTER OF PREGNANCY: ICD-10-CM

## 2023-08-17 DIAGNOSIS — Z34.81 PRENATAL CARE, SUBSEQUENT PREGNANCY IN FIRST TRIMESTER: ICD-10-CM

## 2023-08-17 DIAGNOSIS — O99.891 BACK PAIN IN PREGNANCY: ICD-10-CM

## 2023-08-17 PROCEDURE — 76811 OB US DETAILED SNGL FETUS: CPT

## 2023-08-17 NOTE — PROGRESS NOTES
Pt referred to [facility] for Family Hx of CHD and cleft lip.  During History intake pt denied anyone in her family having a congenital heart defect, did report that her father had cleft lip.

## 2023-08-17 NOTE — LETTER
August 17, 2023     Patient: Yaneth Garcias   YOB: 2000   Date of Visit: 8/17/2023       To Whom It May Concern:    It is my medical opinion that Yaneth Garicas needs to be on light duty due to pregnancy related issues.            Sincerely,        Juli Esquivel MD    CC:   No Recipients

## 2023-08-17 NOTE — PROGRESS NOTES
Chief Complaint   Patient presents with    Routine Prenatal Visit     US done today at [facility]       HPI: Yaneth is a  currently at 20w4d who today reports the following:  Contractions - No; Leaking - No; Vaginal bleeding -  No; Heartburn - No.    ROS:  GI: Nausea - No ; Constipation - No; Diarrhea - No    Neuro: Headache - No ; Visual change - No      EXAM:  Vitals: See prenatal flowsheet   Abdomen: See prenatal flowsheet   Urine glucose/protein: See prenatal flowsheet   Pelvic: See prenatal flowsheet     Lab Results   Component Value Date    ABO O 2023    RH Positive 2023    ABSCRN Negative 2023       MDM:  Impression: Supervision of low risk pregnancy  Low back pain in pregnancy   Family history of CHD and cleft lip   Tests done today: U/S for anatomy screening at [facility] - report not available for review    Topics discussed: Continue with PNV's  Prenatal labs reviewed  Work letter for light duty as she has already been doing this.  Maternity belt, local heat/ice prn low back pain   Tests scheduled today for her next visit:   GCT, CBC  Orders Placed This Encounter   Procedures    Glucose, Post 50 Gm Glucola     Standing Status:   Future     Standing Expiration Date:   2024     Order Specific Question:   Release to patient     Answer:   Routine Release [[id number]]    CBC (No Diff)     Standing Status:   Future     Standing Expiration Date:   2024     Order Specific Question:   Release to patient     Answer:   Routine Release [[id number]]

## 2023-08-20 NOTE — PROGRESS NOTES
Patient seen in Maternal Fetal Medicine clinic today. Please see full note in under imaging tab of patient chart in Epic (Viewpoint report).    Farzaneh Long MD

## 2023-08-22 ENCOUNTER — REFERRAL TRIAGE (OUTPATIENT)
Dept: LABOR AND DELIVERY | Facility: HOSPITAL | Age: 23
End: 2023-08-22
Payer: COMMERCIAL

## 2023-09-06 ENCOUNTER — HOSPITAL ENCOUNTER (EMERGENCY)
Facility: HOSPITAL | Age: 23
Discharge: HOME | End: 2023-09-06
Attending: EMERGENCY MEDICINE
Payer: COMMERCIAL

## 2023-09-06 VITALS
HEART RATE: 90 BPM | OXYGEN SATURATION: 98 % | HEIGHT: 63 IN | SYSTOLIC BLOOD PRESSURE: 115 MMHG | RESPIRATION RATE: 16 BRPM | TEMPERATURE: 98.3 F | WEIGHT: 167 LBS | DIASTOLIC BLOOD PRESSURE: 69 MMHG | BODY MASS INDEX: 29.59 KG/M2

## 2023-09-06 DIAGNOSIS — J06.9 UPPER RESPIRATORY TRACT INFECTION, UNSPECIFIED TYPE: Primary | ICD-10-CM

## 2023-09-06 LAB
B PARAPERT DNA SPEC QL NAA+PROBE: NOT DETECTED
B PERT DNA SPEC QL NAA+PROBE: NOT DETECTED
C PNEUM DNA NPH QL NAA+NON-PROBE: NOT DETECTED
FLUAV SUBTYP SPEC NAA+PROBE: NOT DETECTED
FLUBV RNA ISLT QL NAA+PROBE: NOT DETECTED
HADV DNA SPEC NAA+PROBE: NOT DETECTED
HCOV 229E RNA SPEC QL NAA+PROBE: NOT DETECTED
HCOV HKU1 RNA SPEC QL NAA+PROBE: NOT DETECTED
HCOV NL63 RNA SPEC QL NAA+PROBE: NOT DETECTED
HCOV OC43 RNA SPEC QL NAA+PROBE: NOT DETECTED
HMPV RNA NPH QL NAA+NON-PROBE: NOT DETECTED
HPIV1 RNA ISLT QL NAA+PROBE: NOT DETECTED
HPIV2 RNA SPEC QL NAA+PROBE: NOT DETECTED
HPIV3 RNA NPH QL NAA+PROBE: NOT DETECTED
HPIV4 P GENE NPH QL NAA+PROBE: NOT DETECTED
M PNEUMO IGG SER IA-ACNC: NOT DETECTED
RHINOVIRUS RNA SPEC NAA+PROBE: DETECTED
RSV RNA NPH QL NAA+NON-PROBE: NOT DETECTED
S PYO AG THROAT QL: NEGATIVE
SARS-COV-2 RNA NPH QL NAA+NON-PROBE: NOT DETECTED

## 2023-09-06 PROCEDURE — 87081 CULTURE SCREEN ONLY: CPT | Performed by: EMERGENCY MEDICINE

## 2023-09-06 PROCEDURE — 87880 STREP A ASSAY W/OPTIC: CPT | Performed by: EMERGENCY MEDICINE

## 2023-09-06 PROCEDURE — 99282 EMERGENCY DEPT VISIT SF MDM: CPT

## 2023-09-06 PROCEDURE — 0202U NFCT DS 22 TRGT SARS-COV-2: CPT | Performed by: EMERGENCY MEDICINE

## 2023-09-06 NOTE — ED PROVIDER NOTES
Subjective   History of Present Illness  This is a 22-year-old female presented to the emergency department with some respiratory symptoms.  Patient states that her son is at home and has not been feeling well.  She states that she is having similar symptoms now.  She is been having some clear nasal discharge as well as congestion.  She had a mild nonproductive cough as well.  She denies any difficulty breathing.  She is not having fevers or chills.  No headache or change in vision.  No focal weakness.  No abdominal pain or vomiting.  No diarrhea    History provided by:  Patient   used: No      Review of Systems   Constitutional:  Negative for chills and fever.   HENT:  Positive for congestion. Negative for ear pain and sore throat.    Eyes:  Negative for visual disturbance.   Respiratory:  Positive for cough. Negative for shortness of breath.    Cardiovascular:  Negative for chest pain.   Gastrointestinal:  Negative for abdominal pain.   Genitourinary:  Negative for difficulty urinating.   Musculoskeletal:  Negative for arthralgias.   Skin:  Negative for rash.   Neurological:  Negative for dizziness, weakness and numbness.   Psychiatric/Behavioral:  Negative for agitation.      No past medical history on file.    No Known Allergies    Past Surgical History:   Procedure Laterality Date    TONSILLECTOMY         Family History   Problem Relation Age of Onset    Diabetes Maternal Grandmother     Breast cancer Maternal Grandmother     Hypertension Maternal Grandfather     Diabetes Maternal Grandfather     Breast cancer Maternal Great-Grandmother     Colon cancer Neg Hx     Ovarian cancer Neg Hx     Pancreatic cancer Neg Hx     Prostate cancer Neg Hx        Social History     Socioeconomic History    Marital status:     Number of children: 1    Highest education level: Bachelor's degree (e.g., BA, AB, BS)   Tobacco Use    Smoking status: Never    Smokeless tobacco: Never   Vaping Use    Vaping  Use: Never used   Substance and Sexual Activity    Alcohol use: Never    Drug use: Never    Sexual activity: Yes     Partners: Male           Objective   Physical Exam  Vitals and nursing note reviewed.   Constitutional:       General: She is not in acute distress.     Appearance: She is not ill-appearing or toxic-appearing.   HENT:      Right Ear: Tympanic membrane normal.      Left Ear: Tympanic membrane normal.      Nose: Congestion present.      Mouth/Throat:      Pharynx: No posterior oropharyngeal erythema.   Eyes:      Conjunctiva/sclera: Conjunctivae normal.      Pupils: Pupils are equal, round, and reactive to light.   Cardiovascular:      Rate and Rhythm: Normal rate and regular rhythm.   Pulmonary:      Effort: Pulmonary effort is normal. No respiratory distress.   Abdominal:      General: Abdomen is flat. There is no distension.      Palpations: There is no mass.      Tenderness: There is no abdominal tenderness. There is no guarding or rebound.   Musculoskeletal:         General: No deformity. Normal range of motion.   Skin:     General: Skin is warm.      Findings: No rash.   Neurological:      General: No focal deficit present.      Mental Status: She is alert and oriented to person, place, and time.      Motor: No weakness.       Procedures           ED Course  ED Course as of 09/06/23 0316   Wed Sep 06, 2023   0314 BP: 115/69 [JK]   0315 Temp: 98.3 °F (36.8 °C) [JK]   0315 Temp src: Oral [JK]   0315 Heart Rate: 90 [JK]   0315 Resp: 16 [JK]   0315 SpO2: 98 %  Patient's repeat vitals, telemetry tracing, and pulse oximetry tracing were directly viewed and interpreted by myself.  Patient is hemodynamically stable [JK]   0315 Respiratory Panel PCR w/COVID-19(SARS-CoV-2) MILAN/SHAMEKA/ED/PAD/COR/MAD/GWEN In-House, NP Swab in UTM/VTM, 3-4 HR TAT - Swab, Nasopharynx(!) [JK]   0315 Rapid Strep A Screen - Swab, Throat  Laboratory studies were reviewed and interpreted directly by myself.  Respiratory panel positive  for human rhinovirus, strep swab negative [JK]   9069 On reevaluation, the patient is feeling much better. Nontoxic appearance. Patient tolerating oral intake. Repeat examination did not show any significant abnormalities.  Patient does not have any evidence of respiratory distress, hypoxia or impending respiratory failure.  Patient is to followup with PCP in 48 hours. They are to return if they are having any change in symptoms or worsening symptoms. Verbalized understanding. [JK]   3755 I had a discussion with the patient/family regarding diagnosis, diagnostic results, treatment plan, and medications. The patient/family indicated understanding of these instructions. I spent adequate time at the bedside prior to discharge necessary to discuss the aftercare instructions, giving patient education, providing explanations of the results of our evaluations/findings, and my decision making to assure that the patient/family understand the plan of care. Time was allotted to answer questions at that time and throughout the ED course. Patient is required to maintain timely follow up, as discussed. I also discussed the potential for the development of an acute emergent condition requiring further evaluation, return to the ER, admission, or even surgical intervention.  I encouraged the patient to return to the emergency department immediately for any concerns, worsening symptoms, new complaints, or if symptoms persist and they are unable to seek follow-up in a timely fashion. The patient/family expressed understanding and agreement with this plan    Shared decision making:   After full review of the patient's clinical presentation, review of any work-up including but not limited to laboratory studies and radiology obtained, I had a discussion with the patient.  Treatment options were discussed as well as the risks, benefits and consequences.  I discussed all findings with the patient and family members if available.  During  the discussion, treatment goals were understood by all as well as any misconceptions which were addressed with the patient.  Ample time was given for any questions they may have had.  They are in agreement with the treatment plan as well as final disposition. [JK]      ED Course User Index  [JK] Andres Bernard MD                                           Medical Decision Making  This is a 22-year-old female presented to the emergency department with some cough and congestion.  The patient symptoms seem consistent with a URI and possible bronchitis.  Overall the patient appears nontoxic.  Afebrile.  Swabs will be obtained.      Differential diagnosis: URI, bronchitis, COVID, pneumonia, respiratory illness      Amount and/or Complexity of Data Reviewed  Labs: ordered. Decision-making details documented in ED Course.        Final diagnoses:   Upper respiratory tract infection, unspecified type       ED Disposition  ED Disposition       ED Disposition   Discharge    Condition   Stable    Comment   --               PATIENT CONNECTION - [facility] [address]  [phone]  Call in 1 day           Medication List      No changes were made to your prescriptions during this visit.            Andres Bernard MD  09/06/23 0316

## 2023-09-07 LAB — BACTERIA SPEC AEROBE CULT: NORMAL

## 2023-09-12 ENCOUNTER — PATIENT OUTREACH (OUTPATIENT)
Dept: LABOR AND DELIVERY | Facility: HOSPITAL | Age: 23
End: 2023-09-12
Payer: COMMERCIAL

## 2023-09-12 NOTE — OUTREACH NOTE
[facility]  Enrollment    Current Estimated Gestational Age: 24w2d    Questions/Answers      Flowsheet Row Responses   Would like to participate? Yes   Date of Intake Visit 09/14/23              SUREKHA Tuttle RN  Maternity Nurse Navigator    9/12/2023, 19:07 EDT

## 2023-09-14 ENCOUNTER — PATIENT OUTREACH (OUTPATIENT)
Dept: LABOR AND DELIVERY | Facility: HOSPITAL | Age: 23
End: 2023-09-14
Payer: COMMERCIAL

## 2023-09-14 ENCOUNTER — APPOINTMENT (OUTPATIENT)
Dept: LAB | Facility: HOSPITAL | Age: 23
End: 2023-09-14
Payer: COMMERCIAL

## 2023-09-14 ENCOUNTER — ROUTINE PRENATAL (OUTPATIENT)
Dept: OBSTETRICS AND GYNECOLOGY | Facility: CLINIC | Age: 23
End: 2023-09-14
Payer: COMMERCIAL

## 2023-09-14 ENCOUNTER — LAB (OUTPATIENT)
Dept: LAB | Facility: HOSPITAL | Age: 23
End: 2023-09-14
Payer: COMMERCIAL

## 2023-09-14 VITALS — DIASTOLIC BLOOD PRESSURE: 60 MMHG | WEIGHT: 171 LBS | SYSTOLIC BLOOD PRESSURE: 100 MMHG | BODY MASS INDEX: 30.29 KG/M2

## 2023-09-14 DIAGNOSIS — Z34.82 PRENATAL CARE, SUBSEQUENT PREGNANCY IN SECOND TRIMESTER: ICD-10-CM

## 2023-09-14 DIAGNOSIS — Z34.82 PRENATAL CARE, SUBSEQUENT PREGNANCY IN SECOND TRIMESTER: Primary | ICD-10-CM

## 2023-09-14 LAB
DEPRECATED RDW RBC AUTO: 42 FL (ref 37–54)
ERYTHROCYTE [DISTWIDTH] IN BLOOD BY AUTOMATED COUNT: 14 % (ref 12.3–15.4)
EXTERNAL GTT 1 HOUR: NORMAL
GLUCOSE 1H P 100 G GLC PO SERPL-MCNC: 96 MG/DL (ref 65–139)
HCT VFR BLD AUTO: 35.2 % (ref 34–46.6)
HGB BLD-MCNC: 12.3 G/DL (ref 12–15.9)
MCH RBC QN AUTO: 28.9 PG (ref 26.6–33)
MCHC RBC AUTO-ENTMCNC: 34.9 G/DL (ref 31.5–35.7)
MCV RBC AUTO: 82.8 FL (ref 79–97)
PLATELET # BLD AUTO: 241 10*3/MM3 (ref 140–450)
PMV BLD AUTO: 11.9 FL (ref 6–12)
RBC # BLD AUTO: 4.25 10*6/MM3 (ref 3.77–5.28)
WBC NRBC COR # BLD: 8.79 10*3/MM3 (ref 3.4–10.8)

## 2023-09-14 PROCEDURE — 85027 COMPLETE CBC AUTOMATED: CPT

## 2023-09-14 PROCEDURE — 82950 GLUCOSE TEST: CPT

## 2023-09-14 PROCEDURE — 82962 GLUCOSE BLOOD TEST: CPT

## 2023-09-14 NOTE — PROGRESS NOTES
Chief Complaint   Patient presents with    Routine Prenatal Visit     No c/c       HPI: Yaneth is a  currently at 24w4d who today reports the following:  Contractions - No; Leaking - No; Vaginal bleeding -  No; Heartburn - No.    ROS:  GI: Nausea - No ; Constipation - No; Diarrhea - No    Neuro: Headache - No ; Visual change - No      EXAM:  Vitals: See prenatal flowsheet   Abdomen: See prenatal flowsheet   Urine glucose/protein: See prenatal flowsheet   Pelvic: See prenatal flowsheet     Lab Results   Component Value Date    ABO O 2023    RH Positive 2023    ABSCRN Negative 2023       MDM:  Impression: Supervision of low risk pregnancy   Tests done today: GCT  CBC   Topics discussed: Continue with PNV's  Prenatal labs reviewed  Flu vaccine recommended at any gestational age   labor signs and symptoms  Symptoms of preeclampsia  TDAP vaccination recommended between 27-36 weeks gestation OR after birth   Tests scheduled today for her next visit:   none
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
## 2023-10-03 ENCOUNTER — HOSPITAL ENCOUNTER (OUTPATIENT)
Facility: HOSPITAL | Age: 23
Discharge: HOME | End: 2023-10-03
Attending: OBSTETRICS & GYNECOLOGY | Admitting: OBSTETRICS & GYNECOLOGY
Payer: COMMERCIAL

## 2023-10-03 ENCOUNTER — TELEPHONE (OUTPATIENT)
Dept: OBSTETRICS AND GYNECOLOGY | Facility: CLINIC | Age: 23
End: 2023-10-03

## 2023-10-03 VITALS
DIASTOLIC BLOOD PRESSURE: 66 MMHG | HEIGHT: 63 IN | HEART RATE: 100 BPM | BODY MASS INDEX: 30.29 KG/M2 | TEMPERATURE: 98.4 F | RESPIRATION RATE: 17 BRPM | SYSTOLIC BLOOD PRESSURE: 107 MMHG

## 2023-10-03 LAB — POC AMNISURE: NEGATIVE

## 2023-10-03 PROCEDURE — G0463 HOSPITAL OUTPT CLINIC VISIT: HCPCS

## 2023-10-03 PROCEDURE — 84112 EVAL AMNIOTIC FLUID PROTEIN: CPT | Performed by: OBSTETRICS & GYNECOLOGY

## 2023-10-03 NOTE — LETTER
October 3, 2023     Patient: Yaneth Garcias   YOB: 2000   Date of Visit: 10/3/2023       To Whom It May Concern:    It is my medical opinion that Yaneth Garcias be excused from work on 10/3/2023 as she was seen inpatient at [facility].           Sincerely,    CHEKO Diaz     CC:   No Recipients

## 2023-10-03 NOTE — H&P
"[facility]  Obstetric History and Physical    Chief Complaint   Patient presents with    Pelvic Pain     Patient reports feeling a \"pop\" in her vagina earlier today.       Subjective     Patient is a 22 y.o. female  currently at 27w2d, who presents with reports of pelvic pain.  She states she awoke earlier this morning and got up to void.  When she sommer to go back to bed, she felt like \"pop\" in her vagina followed by intense pubic pain.  She denies vaginal bleeding or leakage of fluid.  Active fetal movement reported.  She denies recent trauma.    Her prenatal care is otherwise reportedly benign. Her previous obstetric/gynecological history is notable for 1 prior term vaginal delivery and 1 first trimester pregnancy loss..    The following portions of the patients history were reviewed and updated as appropriate: current medications, allergies, past medical history, past surgical history, past family history, past social history, and problem list .        Prenatal Information:   Maternal Prenatal Labs  Blood Type No results found for: ABO   Rh Status No results found for: RH   Antibody Screen No results found for: ABSCRN   Gonnorhea No results found for: GCCX   Chlamydia No results found for: CLAMYDCU   RPR No results found for: RPR   Syphilis Antibody No results found for: SYPHILIS   Rubella No results found for: RUBELLAIGGIN   Hepatitis B Surface Antigen No results found for: HEPBSAG   HIV-1 Antibody No results found for: LABHIV1   Hepatitis C Antibody No results found for: HEPCAB   Rapid Urin Drug Screen No results found for: AMPMETHU, BARBITSCNUR, LABBENZSCN, LABMETHSCN, LABOPIASCN, THCURSCR, COCAINEUR, AMPHETSCREEN, PROPOXSCN, BUPRENORSCNU, METAMPSCNUR, OXYCODONESCN, TRICYCLICSCN   Group B Strep Culture No results found for: GBSANTIGEN           External Prenatal Results       Pregnancy Outside Results - Transcribed From Office Records - See Scanned Records For Details       Test Value Date Time    ABO "  O  23 1046    Rh  Positive  23 1046    Antibody Screen  Negative  23 1046    Varicella IgG       Rubella  1.18 index 23 1046    Hgb  12.3 g/dL 23 1153       12.8 g/dL 23 1046    Hct  35.2 % 23 1153       38.2 % 23 1046    Glucose Fasting GTT       Glucose Tolerance Test 1 hour       Glucose Tolerance Test 3 hour       Gonorrhea (discrete)  Negative  23 1000    Chlamydia (discrete)  Negative  23 1000    RPR  Non-Reactive  23 1046    VDRL       Syphilis Antibody       HBsAg  Non-Reactive  23 1046    Herpes Simplex Virus PCR       Herpes Simplex VIrus Culture       HIV  Non-Reactive  23 1046    Hep C RNA Quant PCR       Hep C Antibody  Non-Reactive  23 1046    AFP       Group B Strep       GBS Susceptibility to Clindamycin       GBS Susceptibility to Erythromycin       Fetal Fibronectin       Genetic Testing, Maternal Blood                 Drug Screening       Test Value Date Time    Urine Drug Screen       Amphetamine Screen  Negative  23 1000    Barbiturate Screen  Negative  23 1000    Benzodiazepine Screen  Negative  23 1000    Methadone Screen  Negative  23 1000    Phencyclidine Screen  Negative  23 1000    Opiates Screen  Negative  23 1000    THC Screen  Negative  23 1000    Cocaine Screen       Propoxyphene Screen  Negative  23 1000    Buprenorphine Screen  Negative  23 1000    Methamphetamine Screen       Oxycodone Screen  Negative  23 1000    Tricyclic Antidepressants Screen  Negative  23 1000              Legend    ^: Historical                              Past OB History:       OB History    Para Term  AB Living   3 1 1 0 1 1   SAB IAB Ectopic Molar Multiple Live Births   1 0 0 0 0 1      # Outcome Date GA Lbr Saman/2nd Weight Sex Delivery Anes PTL Lv   3 Current            2 SAB  6w0d    SAB      1 Term 19 39w0d  3402 g (7 lb 8 oz) M  Vag-Spont None  LAYLA      Obstetric Comments   No history of STIs   No abnormal paps in the past   Nurse Tech Supervisor/Registration    2019 - [facility]   FOB not involved    Fob #1 - Pregnancy #1 - #3       Past Medical History:  History reviewed. No pertinent past medical history.   Past Surgical History Past Surgical History:   Procedure Laterality Date    TONSILLECTOMY      WISDOM TOOTH EXTRACTION        Family History: Family History   Problem Relation Age of Onset    Diabetes Maternal Grandmother     Breast cancer Maternal Grandmother     Hypertension Maternal Grandfather     Diabetes Maternal Grandfather     Breast cancer Maternal Great-Grandmother     Colon cancer Neg Hx     Ovarian cancer Neg Hx     Pancreatic cancer Neg Hx     Prostate cancer Neg Hx       Social History:  reports that she has never smoked. She has never used smokeless tobacco.   reports no history of alcohol use.   reports no history of drug use.   Allergies: Patient has no known allergies.  Current Medications:          No current facility-administered medications on file prior to encounter.     Current Outpatient Medications on File Prior to Encounter   Medication Sig Dispense Refill    Prenatal Vit-Fe Fumarate-FA (prenatal vitamin 28-0.8) 28-0.8 MG tablet tablet Take 1 tablet by mouth Daily. 30 tablet 5    Acetaminophen (TYLENOL PO) Take  by mouth.      vitamin B-6 (PYRIDOXINE) 25 MG tablet          General ROS: Pertinent items are noted in HPI, all other systems reviewed and negative    Objective       Vital Signs Range for the last 24 hours  Temperature: Temp:  [98.4 °F (36.9 °C)] 98.4 °F (36.9 °C)   Temp Source: Temp src: Oral   BP: BP: (101-107)/(64-66) 107/66   Pulse: Heart Rate:  [100] 100   Respirations: Resp:  [17] 17   SPO2:     O2 Amount (l/min):     O2 Devices     Weight:       Physical Examination: General appearance - alert, well appearing, and in no distress, oriented to person, place, and time, and overweight  Mental status -  alert, oriented to person, place, and time, normal mood, behavior, speech, dress, motor activity, and thought processes  Eyes - pupils equal and reactive, extraocular eye movements intact, sclera anicteric  Neck - supple, no significant adenopathy, thyroid exam: thyroid is normal in size without nodules or tenderness  Chest - clear to auscultation, no wheezes, rales or rhonchi, symmetric air entry  Heart - normal rate, regular rhythm, normal S1, S2, no murmurs, rubs, clicks or gallops  Abdomen-soft, nontender, nondistended, no masses or organomegaly   Abdomen, Non-Tender  Pelvic - VULVA: normal appearing vulva with no masses, tenderness or lesions, CERVIX: Closed/long.  The cervix remains very posterior and firm.  Neurological - alert, oriented, normal speech, no focal findings or movement disorder noted, DTR's normal and symmetric  Extremities - no pedal edema noted    Fetal Heart Rate Assessment  Indication: Pelvic pain in pregnancy   Start Time: 1542                end Time: 1648   NST Results: NST reactive.  Fetal heart rate baseline 135-140 bpm.  Moderate variability with 10 x 10 accelerations noted.  No decelerations.  No regular uterine contraction pattern.        Laboratory Results:   Lab Results   Component Value Date    CREATININE 0.66 2022       No results found for: WBC, RBC, HGB, HCT, MCV, MCH, MCHC, RDW, RDWSD, MPV, PLT, NEUTRORELPCT, LYMPHORELPCT, MONORELPCT, EOSRELPCT, BASORELPCT, AUTOIGPER, NEUTROABS, LYMPHSABS, MONOSABS, EOSABS, BASOSABS, AUTOIGNUM, NRBC          Brief Urine Lab Results  (Last result in the past 365 days)        Color   Clarity   Blood   Leuk Est   Nitrite   Protein   CREAT   Urine HCG        23 1416 Yellow   Cloudy   Moderate (2+)   Large (3+)   Negative   Negative                     Radiology Review: No new studies  Other Studies: AmniSure performed, results negative.    Assessment & Plan       * No active hospital problems. *        Assessment:  Pelvic pain  "in pregnancy.  No evidence of  cervical dilation.  No evidence of ruptured membranes.    Plan:  Discharge home.  Keep regularly scheduled OB office visit.     Total time spent today with Yaneth  was 20-29 minutes (level 3).  Of this time, > 50% was spent face-to-face time coordinating care, answering her questions and counseling regarding pathophysiology of her presenting problem along with plans for any diagnostic work-up and treatment.        Sonu Oseha \"Hannah\" JESSICA Trujillo MD  10/3/2023  16:51 EDT    "

## 2023-10-03 NOTE — TELEPHONE ENCOUNTER
LAQUITA      Patient called stating that after she went to the bathroom, she went to lay back down and she heard a pop in her vagina and after that it has hurt her to walk.  Please Advise

## 2023-10-03 NOTE — DISCHARGE INSTRUCTIONS
Return to labor cheema if any of the following occur: decreased fetal movement, vaginal bleeding, leaking of fluid

## 2023-10-03 NOTE — TELEPHONE ENCOUNTER
"Patient called and states that she went to the restroom and went back to bed, she states when she laid back down she felt a \"pop\" sensation vaginally and since then she states that she is having vaginal pain when walking since.  Patient rates that pain a 5 on 0-10 scale.  Patient denies any vaginal bleeding/spotting/leakage.  Patient states fetal movement has been normal and active. No abdominal or back pain, pain doesn't even seem to be pelvic either - just external in vaginal area.  Visually she notes no vaginal changes.  She has not tried taking any tylenol.  She also states that her water intake has been a bit low lately.   "

## 2023-10-03 NOTE — NURSING NOTE
Pt discharged home per MD order. RN instructed pt to return to labor cheema if any of the following occur: decreased fetal movement, vaginal bleeding, leaking of fluid. Pt verbalized understanding. Pt denies any questions at this time. Pt ambulating off unit in stable condition with all personal belongings.

## 2023-10-04 ENCOUNTER — TELEPHONE (OUTPATIENT)
Dept: OBSTETRICS AND GYNECOLOGY | Facility: CLINIC | Age: 23
End: 2023-10-04
Payer: COMMERCIAL

## 2023-10-04 NOTE — TELEPHONE ENCOUNTER
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 PT WOULD LIKE A CALL BACK ONCE PAPER [facility] IS S[facility]

## 2023-10-04 NOTE — TELEPHONE ENCOUNTER
Called and informed patient of Dr. Andrea' advisement and review, she verified understanding and will reach out next week.

## 2023-10-04 NOTE — TELEPHONE ENCOUNTER
Called and spoke with patient.  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  She would like to know if a letter could be written putting her on light duty, restricted to desk work only at work for the time being. No issues with length of shifts.

## 2023-10-04 NOTE — TELEPHONE ENCOUNTER
Let her know that Dr. Esquivel is out this week.  This is something she can talk to Dr. Esquivel of about when she next is seen.  If she wants to reach back out to Dr. Esquivel next week and see if she would accommodate this before her October 26 appointment, that would also be a choice she can make

## 2023-10-12 ENCOUNTER — TELEPHONE (OUTPATIENT)
Dept: OBSTETRICS AND GYNECOLOGY | Facility: CLINIC | Age: 23
End: 2023-10-12

## 2023-10-12 RX ORDER — ONDANSETRON 4 MG/1
4 TABLET, FILM COATED ORAL EVERY 8 HOURS PRN
Qty: 30 TABLET | Refills: 1 | Status: SHIPPED | OUTPATIENT
Start: 2023-10-12 | End: 2024-10-11

## 2023-10-12 RX ORDER — METOCLOPRAMIDE 10 MG/1
10 TABLET ORAL EVERY 6 HOURS PRN
Qty: 20 TABLET | Refills: 1 | Status: SHIPPED | OUTPATIENT
Start: 2023-10-12

## 2023-10-12 NOTE — TELEPHONE ENCOUNTER
New Medications Ordered This Visit   Medications    metoclopramide (Reglan) 10 MG tablet     Sig: Take 1 tablet by mouth Every 6 (Six) Hours As Needed (nausea, vomiting).     Dispense:  20 tablet     Refill:  1    ondansetron (Zofran) 4 MG tablet     Sig: Take 1 tablet by mouth Every 8 (Eight) Hours As Needed for Nausea or Vomiting.     Dispense:  30 tablet     Refill:  1     Above meds sent.   If not getting relief in symptoms with medications she needs to be evaluated there    Thanks, Juli Esquivel MD

## 2023-10-12 NOTE — TELEPHONE ENCOUNTER
28 weeks pregnant.  Having nausea, feels like motion sickness.  It started yesterday.  Patient is in [place] right now.

## 2023-10-16 ENCOUNTER — PATIENT OUTREACH (OUTPATIENT)
Dept: LABOR AND DELIVERY | Facility: HOSPITAL | Age: 23
End: 2023-10-16
Payer: COMMERCIAL

## 2023-10-16 NOTE — OUTREACH NOTE
[facility]  Check-In    Current Estimated Gestational Age: 29w1d      Questions/Answers      Flowsheet Row Responses   Best Method for Contacting Cell   Demographics Reviewed Yes   Currently Employed Yes   Able to keep appointments as scheduled Yes  [had to cancel one recently due to being out of town]   Gender(s) and Name(s) Girl   Baby Active/Feeling Fetal Movemen Yes   How are you presently feeling? Good   Are you having any of the following symptoms? --  [Denies]   Questions regarding prenatal visits or tests to be ordered? No   Education related to new diagnoses/home equipment No   Special Considerations Birthing Ball, Birth Plan   Supplies ready for baby Car Seat   Resource/Environmental Concerns Financial   Do you have any questions related to your care experience, your pregnancy, plans for delivery, any concerns, etc? No   Other Education Birth Plan, HANDS, How to find a dentist, Insurance benefits/Incentives          Yaneth is feeling well and reports good fetal movement. Denies any concerning symptoms but discussed what to watch out for. Discussed that she still has time for childbirth education if she is interested. Discussed breastfeeding video and outpatient lactation clinic support as well as WIC support. She has begun gathering items she will need for baby.    Updated resources provided via [employer] message. Encouraged to look at both the [employer] message and in the Visits tab for educational items pertaining to her pregnancy in the AVS. Contact information provided. Encouraged to call with questions, concerns, or for support. Will plan f/u around 35 weeks to ensure she has everything she needs in place and feels ready for delivery.      SUREKHA Tuttle RN  Maternity Nurse Navigator    10/16/2023, 16:09 EDT

## 2023-10-25 PROBLEM — Z34.83 PRENATAL CARE, SUBSEQUENT PREGNANCY IN THIRD TRIMESTER: Status: ACTIVE | Noted: 2023-06-08

## 2023-10-26 ENCOUNTER — TELEPHONE (OUTPATIENT)
Dept: OBSTETRICS AND GYNECOLOGY | Facility: CLINIC | Age: 23
End: 2023-10-26

## 2023-10-26 NOTE — TELEPHONE ENCOUNTER
Attempted to contact pt to see if she could come in on 10/31/23 at 0830 to be seen and there was no answer.  Message was left for her to give the office a call back.

## 2023-10-31 ENCOUNTER — ROUTINE PRENATAL (OUTPATIENT)
Dept: OBSTETRICS AND GYNECOLOGY | Facility: CLINIC | Age: 23
End: 2023-10-31
Payer: COMMERCIAL

## 2023-10-31 VITALS — DIASTOLIC BLOOD PRESSURE: 66 MMHG | BODY MASS INDEX: 30.4 KG/M2 | WEIGHT: 171.6 LBS | SYSTOLIC BLOOD PRESSURE: 118 MMHG

## 2023-10-31 DIAGNOSIS — Z34.83 PRENATAL CARE, SUBSEQUENT PREGNANCY IN THIRD TRIMESTER: Primary | ICD-10-CM

## 2023-10-31 DIAGNOSIS — Z3A.31 31 WEEKS GESTATION OF PREGNANCY: ICD-10-CM

## 2023-10-31 DIAGNOSIS — O09.33 INSUFFICIENT PRENATAL CARE IN THIRD TRIMESTER: ICD-10-CM

## 2023-10-31 LAB
GLUCOSE UR STRIP-MCNC: NEGATIVE MG/DL
PROT UR STRIP-MCNC: NEGATIVE MG/DL

## 2023-10-31 NOTE — PROGRESS NOTES
Chief Complaint   Patient presents with    Routine Prenatal Visit              HPI: Yaneth is a  currently at 31w2d who today reports the following:  Contractions - No; Leaking - No; Vaginal bleeding -  No; Heartburn - No.    ROS:  GI: Nausea - No ; Constipation - No; Diarrhea - No    Neuro: Headache - No ; Visual change - No      EXAM:  Vitals: See prenatal flowsheet   Abdomen: See prenatal flowsheet   Urine glucose/protein: See prenatal flowsheet   Pelvic: See prenatal flowsheet     Lab Results   Component Value Date    HGB 12.3 2023    HCT 35.2 2023    GCT 96 2023    ABO O 2023    RH Positive 2023    ABSCRN Negative 2023       MDM:  Impression: Supervision of low risk pregnancy  Insufficient prenatal care    Tests done today: none   Topics discussed: Continue with PNV's  Prenatal labs reviewed  COVID vaccine recommended at any gestational age  Flu vaccine recommended at any gestational age   labor signs and symptoms  Symptoms of preeclampsia  TDAP vaccination recommended between 27-36 weeks gestation OR after birth  Fetal movement counts  Reviewed my current pregnant state and CHRISTIANO  RSV vaccine at ~ 32-36 weeks    Tests scheduled today for her next visit:   none

## 2023-11-06 ENCOUNTER — HOSPITAL ENCOUNTER (OUTPATIENT)
Facility: HOSPITAL | Age: 23
Discharge: HOME | End: 2023-11-06
Attending: OBSTETRICS & GYNECOLOGY | Admitting: OBSTETRICS & GYNECOLOGY
Payer: COMMERCIAL

## 2023-11-06 VITALS
DIASTOLIC BLOOD PRESSURE: 65 MMHG | TEMPERATURE: 97.7 F | RESPIRATION RATE: 16 BRPM | HEIGHT: 63 IN | HEART RATE: 91 BPM | SYSTOLIC BLOOD PRESSURE: 117 MMHG | BODY MASS INDEX: 30.48 KG/M2 | WEIGHT: 172 LBS

## 2023-11-06 PROBLEM — R10.9 ABDOMINAL PAIN: Status: ACTIVE | Noted: 2023-11-06

## 2023-11-06 LAB
BACTERIA UR QL AUTO: NORMAL /HPF
BILIRUB UR QL STRIP: NEGATIVE
CLARITY UR: CLEAR
COLOR UR: YELLOW
GLUCOSE UR STRIP-MCNC: NEGATIVE MG/DL
HGB UR QL STRIP.AUTO: NEGATIVE
HYALINE CASTS UR QL AUTO: NORMAL /LPF
KETONES UR QL STRIP: NEGATIVE
LEUKOCYTE ESTERASE UR QL STRIP.AUTO: ABNORMAL
NITRITE UR QL STRIP: NEGATIVE
PH UR STRIP.AUTO: 7 [PH] (ref 5–8)
PROT UR QL STRIP: NEGATIVE
RBC # UR STRIP: NORMAL /HPF
REF LAB TEST METHOD: NORMAL
SP GR UR STRIP: 1.01 (ref 1–1.03)
SQUAMOUS #/AREA URNS HPF: NORMAL /HPF
UROBILINOGEN UR QL STRIP: ABNORMAL
WBC # UR STRIP: NORMAL /HPF

## 2023-11-06 PROCEDURE — 81001 URINALYSIS AUTO W/SCOPE: CPT | Performed by: OBSTETRICS & GYNECOLOGY

## 2023-11-06 PROCEDURE — 59025 FETAL NON-STRESS TEST: CPT

## 2023-11-06 PROCEDURE — G0378 HOSPITAL OBSERVATION PER HR: HCPCS

## 2023-11-06 PROCEDURE — G0463 HOSPITAL OUTPT CLINIC VISIT: HCPCS

## 2023-11-06 RX ORDER — OXYTOCIN 10 [USP'U]/ML
INJECTION, SOLUTION INTRAMUSCULAR; INTRAVENOUS
Status: DISCONTINUED
Start: 2023-11-06 | End: 2023-11-06 | Stop reason: HOSPADM

## 2023-11-06 NOTE — DISCHARGE INSTR - APPOINTMENTS
Please keep your next scheduled appointment with Dr Esquivel. If you have any concerns before your next appointment phone the office or return to Labor & Delivery.

## 2023-11-06 NOTE — LETTER
November 6, 2023     Patient: Yaneth Garcias   YOB: 2000   Date of Visit: 11/6/2023       To Whom It May Concern:    It is my medical opinion that Yaneth Garcias may return to work on 11/7/23 .           Sincerely,        Anupam Lanier

## 2023-11-06 NOTE — H&P
"[facility]  Obstetric History and Physical    Referring Provider: Juli Esquivel MD      Chief Complaint   Patient presents with    adominal pain       Subjective     Patient is a 22 y.o. female  currently at 32w1d, who presents with complaint of abdominal pain.  Patient reports mild intermittent lower abdominal pain increased with activity as well as at times constant low back pain.  Patient had leaking of fluid, vaginal bleeding, recent trauma, and any other concerns.  Patient reports normal fetal activity.  OB history history significant for previous term vaginal delivery        The following portions of the patients history were reviewed and updated as appropriate: current medications, allergies, past medical history, past surgical history, past family history, past social history, and problem list .       Prenatal Information:   Maternal Prenatal Labs  Blood Type No results found for: \"ABO\"   Rh Status No results found for: \"RH\"   Antibody Screen No results found for: \"ABSCRN\"   Gonnorhea No results found for: \"GCCX\"   Chlamydia No results found for: \"CLAMYDCU\"   RPR No results found for: \"RPR\"   Syphilis Antibody No results found for: \"SYPHILIS\"   Rubella No results found for: \"RUBELLAIGGIN\"   Hepatitis B Surface Antigen No results found for: \"HEPBSAG\"   HIV-1 Antibody No results found for: \"LABHIV1\"   Hepatitis C Antibody No results found for: \"HEPCAB\"   Rapid Urin Drug Screen No results found for: \"AMPMETHU\", \"BARBITSCNUR\", \"LABBENZSCN\", \"LABMETHSCN\", \"LABOPIASCN\", \"THCURSCR\", \"COCAINEUR\", \"AMPHETSCREEN\", \"PROPOXSCN\", \"BUPRENORSCNU\", \"METAMPSCNUR\", \"OXYCODONESCN\", \"TRICYCLICSCN\"   Group B Strep Culture No results found for: \"GBSANTIGEN\"           External Prenatal Results       Pregnancy Outside Results - Transcribed From Office Records - See Scanned Records For Details       Test Value Date Time    ABO  O  23 1046    Rh  Positive  23 1046    Antibody Screen  Negative  23 1046 "    Varicella IgG       Rubella  1.18 index 23 1046    Hgb  12.3 g/dL 23 1153       12.8 g/dL 23 1046    Hct  35.2 % 23 1153       38.2 % 23 1046    Glucose Fasting GTT       Glucose Tolerance Test 1 hour       Glucose Tolerance Test 3 hour       Gonorrhea (discrete)  Negative  23 1000    Chlamydia (discrete)  Negative  23 1000    RPR  Non-Reactive  23 1046    VDRL       Syphilis Antibody       HBsAg  Non-Reactive  23 1046    Herpes Simplex Virus PCR       Herpes Simplex VIrus Culture       HIV  Non-Reactive  23 1046    Hep C RNA Quant PCR       Hep C Antibody  Non-Reactive  23 1046    AFP       Group B Strep       GBS Susceptibility to Clindamycin       GBS Susceptibility to Erythromycin       Fetal Fibronectin       Genetic Testing, Maternal Blood                 Drug Screening       Test Value Date Time    Urine Drug Screen       Amphetamine Screen  Negative  23 1000    Barbiturate Screen  Negative  23 1000    Benzodiazepine Screen  Negative  23 1000    Methadone Screen  Negative  23 1000    Phencyclidine Screen  Negative  23 1000    Opiates Screen  Negative  23 1000    THC Screen  Negative  23 1000    Cocaine Screen       Propoxyphene Screen  Negative  23 1000    Buprenorphine Screen  Negative  23 1000    Methamphetamine Screen       Oxycodone Screen  Negative  23 1000    Tricyclic Antidepressants Screen  Negative  23 1000              Legend    ^: Historical                              Past OB History:       OB History    Para Term  AB Living   3 1 1 0 1 1   SAB IAB Ectopic Molar Multiple Live Births   1 0 0 0 0 1      # Outcome Date GA Lbr Saman/2nd Weight Sex Delivery Anes PTL Lv   3 Current            2 SAB  6w0d    SAB      1 Term 19 39w0d  3402 g (7 lb 8 oz) M Vag-Spont None  LAYLA      Obstetric Comments   No history of STIs   No abnormal paps in the past    Nurse Tech Supervisor/Registration    2019 - [facility]   FOB not involved    Fob #1 - Pregnancy #1 - #3       Past Medical History: No past medical history on file.   Past Surgical History Past Surgical History:   Procedure Laterality Date    TONSILLECTOMY      WISDOM TOOTH EXTRACTION        Family History: Family History   Problem Relation Age of Onset    Diabetes Maternal Grandmother     Breast cancer Maternal Grandmother     Hypertension Maternal Grandfather     Diabetes Maternal Grandfather     Breast cancer Maternal Great-Grandmother     Colon cancer Neg Hx     Ovarian cancer Neg Hx     Pancreatic cancer Neg Hx     Prostate cancer Neg Hx       Social History:  reports that she has never smoked. She has never used smokeless tobacco.   reports no history of alcohol use.   reports no history of drug use.                   General ROS Negative Findings:Headaches, Visual Changes, Epigastric pain, Anorexia, Nausia/Vomiting, ROM, and Vaginal Bleeding    ROS     All other systems have been reviewed and are neg  Objective       Vital Signs Range for the last 24 hours  Temperature: Temp:  [97.7 °F (36.5 °C)] 97.7 °F (36.5 °C)   Temp Source: Temp src: Oral   BP: BP: (117)/(65) 117/65   Pulse: Heart Rate:  [91] 91   Respirations: Resp:  [16] 16   SPO2:     O2 Amount (l/min):     O2 Devices     Weight: Weight:  [78 kg (172 lb)] 78 kg (172 lb)     Physical Examination:   General:   alert, appears stated age, and cooperative   Skin:   normal   HEENT:     Lungs:      Heart:      Gastrointestinal: Abdomen soft, gravid uterus, no guarding benign exam negative CVA tenderness   Lower Extremities: No edema, no calf tenderness   : External genitalia: normal general appearance  Uterus: enlarged   Musculoskeletal:     Neuro: Focal deficits noted         Presentation:    Cervix: Exam by: Method: sterile exam per physician   Dilation:  Closed   Effacement:  Thick   Station:  Not engaged  No blood noted on glove.       Fetal Heart Rate  Assessment   Method: Fetal HR Assessment Method: external   Beats/min: Fetal HR (beats/min): 135   Baseline: Fetal HR Baseline: normal range   Varibility: Fetal HR Variability: moderate (amplitude range 6 to 25 bpm)   Accels: Fetal HR Accelerations: greater than/equal to 15 bpm, lasting at least 15 seconds   Decels: Fetal HR Decelerations: absent   Tracing Category:     ST-indications abdominal pain, interpretation reactive, moderate variability, accelerations present 15 x 15, no fetal deceleration noted, onset 132, end time 1152, no regular contractions noted.  Uterine Assessment   Method: Method: external tocotransducer   Frequency (min):     Ctx Count in 10 min:     Duration:     Intensity:     Intensity by IUPC:     Resting Tone:     Resting Tone by IUPC:     [place] Units:       Laboratory Results:   Lab Results (last 24 hours)       Procedure Component Value Units Date/Time    Urinalysis With Microscopic If Indicated (No Culture) - Urine, Clean Catch [[id number]]  (Abnormal) Collected: 11/06/23 1131    Specimen: Urine, Clean Catch Updated: 11/06/23 1155     Color, UA Yellow     Appearance, UA Clear     pH, UA 7.0     Specific Gravity, UA 1.009     Glucose, UA Negative     Ketones, UA Negative     Bilirubin, UA Negative     Blood, UA Negative     Protein, UA Negative     Leuk Esterase, UA Small (1+)     Nitrite, UA Negative     Urobilinogen, UA 0.2 E.U./dL    Urinalysis, Microscopic Only - Urine, Clean Catch [[id number]] Collected: 11/06/23 1131    Specimen: Urine, Clean Catch Updated: 11/06/23 1155     RBC, UA 0-2 /HPF      WBC, UA 0-2 /HPF      Bacteria, UA None Seen /HPF      Squamous Epithelial Cells, UA 0-2 /HPF      Hyaline Casts, UA 0-6 /LPF      Methodology Automated Microscopy          Radiology Review:   Imaging Results (Last 24 Hours)       ** No results found for the last 24 hours. **          Other Studies:    Assessment & Plan       Abdominal pain        Assessment:  1.  Intrauterine pregnancy at  32w1d weeks gestation with reactive fetal status.    2.  False labor  3.  Discomforts of pregnancy  4.      Plan:  1.  Discharged home,  labor instructions given, instructed patient proper nutrition, hydration, and activity.  Follow-up OB provider this week for scheduled  visit or as needed.  2. Plan of care has been reviewed with patient.  3.  Risks, benefits of treatment plan have been discussed.  4.  All questions have been answered.  5      Anupam Wong, DO  2023  11:58 EST

## 2023-11-09 ENCOUNTER — ROUTINE PRENATAL (OUTPATIENT)
Dept: OBSTETRICS AND GYNECOLOGY | Facility: CLINIC | Age: 23
End: 2023-11-09
Payer: COMMERCIAL

## 2023-11-09 VITALS — WEIGHT: 172 LBS | SYSTOLIC BLOOD PRESSURE: 110 MMHG | BODY MASS INDEX: 30.47 KG/M2 | DIASTOLIC BLOOD PRESSURE: 60 MMHG

## 2023-11-09 DIAGNOSIS — Z82.79 FAMILY HISTORY OF CONGENITAL HEART DISEASE: ICD-10-CM

## 2023-11-09 DIAGNOSIS — Z3A.32 32 WEEKS GESTATION OF PREGNANCY: Primary | ICD-10-CM

## 2023-11-09 DIAGNOSIS — Z82.79 FAMILY HISTORY OF CLEFT LIP: ICD-10-CM

## 2023-11-09 DIAGNOSIS — Z34.83 PRENATAL CARE, SUBSEQUENT PREGNANCY IN THIRD TRIMESTER: ICD-10-CM

## 2023-11-09 DIAGNOSIS — O09.33 INSUFFICIENT PRENATAL CARE IN THIRD TRIMESTER: ICD-10-CM

## 2023-11-09 LAB
GLUCOSE UR STRIP-MCNC: NEGATIVE MG/DL
PROT UR STRIP-MCNC: NEGATIVE MG/DL

## 2023-11-21 ENCOUNTER — TELEPHONE (OUTPATIENT)
Dept: OBSTETRICS AND GYNECOLOGY | Facility: CLINIC | Age: 23
End: 2023-11-21

## 2023-11-21 NOTE — TELEPHONE ENCOUNTER
LAQUITA      Patient went to [facility] this morning due to coughing and having a hard time breathing.  They did a chest xray and patient is concerned of any harm that may have been done to her baby due to the xray

## 2023-11-21 NOTE — TELEPHONE ENCOUNTER
Are we able to please request those records?    She can be informed that I anticipate they thought the risk of the chest x-ray were outweighed by the benefits that they could gain from the imaging.  Reviewed in a similar clinical scenario we would have decided the same thing if it was indicated.  Reviewed usually they are also able to shield the abdomen with doing a chest x-ray.    Thanks, Juli Esquivel MD

## 2023-11-21 NOTE — TELEPHONE ENCOUNTER
Called and spoke with patient, she was informed of Dr. Esquivel's response and advisement and verified understanding.  She stated that she has been struggling with the cough but that she has not tried Robitussin at this time as advised by the ED she went to.  She will try this and if it does not help she will reach out for further advisement to see if there is anything else.    Patient will get records faxed over to us from this visit as well.     Routing to provider to inform of update/conversation.

## 2023-11-23 NOTE — Clinical Note
[facility] EMERGENCY DEPARTMENT  [address]  [facility] [address]  Phone: [phone]    Yaneth Garcias was seen and treated in our emergency department on 9/6/2023.  She may return to work on 09/08/2023.         Thank you for choosing [facility].    Andres Bernard MD      
Assistance OOB with selected safe patient handling equipment if applicable/Assistance with ambulation/Communicate risk of Fall with Harm to all staff, patient, and family/Provide visual cue: red socks, yellow wristband, yellow gown, etc/Reinforce activity limits and safety measures with patient and family/Bed in lowest position, wheels locked, appropriate side rails in place/Call bell, personal items and telephone in reach/Instruct patient to call for assistance before getting out of bed/chair/stretcher/Non-slip footwear applied when patient is off stretcher/[place] to call system/Physically safe environment - no spills, clutter or unnecessary equipment/Purposeful Proactive Rounding/Room/bathroom lighting operational, light cord in reach

## 2023-11-26 PROBLEM — Z86.16 HISTORY OF COVID-19: Status: ACTIVE | Noted: 2023-11-26

## 2023-11-28 ENCOUNTER — ROUTINE PRENATAL (OUTPATIENT)
Dept: OBSTETRICS AND GYNECOLOGY | Facility: CLINIC | Age: 23
End: 2023-11-28
Payer: COMMERCIAL

## 2023-11-28 ENCOUNTER — PATIENT OUTREACH (OUTPATIENT)
Dept: LABOR AND DELIVERY | Facility: HOSPITAL | Age: 23
End: 2023-11-28
Payer: COMMERCIAL

## 2023-11-28 VITALS — BODY MASS INDEX: 30.82 KG/M2 | DIASTOLIC BLOOD PRESSURE: 70 MMHG | SYSTOLIC BLOOD PRESSURE: 110 MMHG | WEIGHT: 174 LBS

## 2023-11-28 DIAGNOSIS — Z86.16 HISTORY OF COVID-19: ICD-10-CM

## 2023-11-28 DIAGNOSIS — Z34.83 PRENATAL CARE, SUBSEQUENT PREGNANCY IN THIRD TRIMESTER: Primary | ICD-10-CM

## 2023-11-28 DIAGNOSIS — Z36.85 ANTENATAL SCREENING FOR STREPTOCOCCUS B: ICD-10-CM

## 2023-11-28 RX ORDER — GUAIFENESIN 100 MG/5ML
LIQUID ORAL
COMMUNITY
Start: 2023-11-20

## 2023-11-28 RX ORDER — PROMETHAZINE HYDROCHLORIDE 25 MG/1
TABLET ORAL
COMMUNITY
Start: 2023-11-20

## 2023-11-28 RX ORDER — ASPIRIN 81 MG/1
81 TABLET ORAL DAILY
Qty: 30 TABLET | Refills: 11 | Status: SHIPPED | OUTPATIENT
Start: 2023-11-28

## 2023-11-28 NOTE — PROGRESS NOTES
Chief Complaint   Patient presents with    Routine Prenatal Visit     US done today / GBS today       HPI: Yaneth is a  currently at 35w2d who today reports the following:  Contractions - No; Leaking - No; Vaginal bleeding -   pink discharge on Thanksgiving day and maybe another day but nothing in the past ~ 24 hours ; Swelling of extremities - No.    ROS:  GI: Nausea - No; Constipation - No; Diarrhea - No    Neuro: Headache - No; Visual change - No      EXAM:  Vitals: See prenatal flowsheet   Abdomen: See prenatal flowsheet   Urine glucose/protein: See prenatal flowsheet   Pelvic: See prenatal flowsheet  SSE - no pool, neg nitrazine, neg fern, no blood seen     Cervix 0.5 cm   MDM:   Impression: Supervision of low risk pregnancy  History of COVID   Tests done today: GBS testing  U/S for EFW - 2474 grams (33%), AC 58%, anterior placenta, normal fluid   Topics discussed: Continue with PNV's  Prenatal labs reviewed   labor signs and symptoms  Symptoms of preeclampsia  Schedule elective IOL per patient request    Tests scheduled today for her next visit:   none

## 2023-11-28 NOTE — OUTREACH NOTE
[facility]  Check-In    Current Estimated Gestational Age: 35w2d      Questions/Answers      Flowsheet Row Responses   Best Method for Contacting Cell   Demographics Reviewed Yes   Currently Employed Yes   Able to keep appointments as scheduled Yes   Gender(s) and Name(s) Girl   Baby Active/Feeling Fetal Movemen Yes   How are you presently feeling? Good   Are you having any of the following symptoms? Contractions, Pressure, Vaginal Bleeding  [cramping, vaginal pressure, spotting]   Questions regarding prenatal visits or tests to be ordered? No   Education related to new diagnoses/home equipment No   Special Considerations Birthing Ball, Birth Plan   Supplies ready for baby Breast Pump, Car Seat, Clothing, Crib, Feeding Supplies   Resource/Environmental Concerns Financial   Do you have any questions related to your care experience, your pregnancy, plans for delivery, any concerns, etc? No   Other Education Birth Plan, SNAP Benefits, WIC Benefits, Mental Health Services, Meds to Beds, Insurance benefits/Incentives            Yaneth is feeling well and reports good fetal movement. Discussed what to expect when she goes in to L&D including admission process, induction process, comfort measures, positioning, pain medication/Fentanyl, epidural and natural labor options, delivery, skin to skin, breastfeeding, post-delivery, and MB care. VU.    She is feeling mostly ready for delivery. She has enrolled in WIC and feels like she has everything she will need for baby except diapers. States she is not currently speaking with the FOB and she doesn't know if he will want to see the baby or not. Requested she bring a picture of him to the hospital that way if there are any problems or concerns with him, security can have his information. Discussed if he decides he does want to see the baby, we can work with the  to ensure she does not have to have him in her room or be around him if she does not want to.  VU.    Updated resources provided via [employer] message. Encouraged to look at both the MyChart message and in the Visits tab for educational items pertaining to her pregnancy in the AVS. Contact information provided. Encouraged to call with questions, concerns, or for support. Will plan to see inpatient after delivery.      SUREKHA Tuttle RN  Maternity Nurse Navigator    11/28/2023, 09:33 EST

## 2023-12-04 RX ORDER — METRONIDAZOLE 500 MG/1
500 TABLET ORAL 2 TIMES DAILY
Qty: 14 TABLET | Refills: 0 | Status: SHIPPED | OUTPATIENT
Start: 2023-12-04 | End: 2023-12-11

## 2023-12-05 ENCOUNTER — ROUTINE PRENATAL (OUTPATIENT)
Dept: OBSTETRICS AND GYNECOLOGY | Facility: CLINIC | Age: 23
End: 2023-12-05
Payer: COMMERCIAL

## 2023-12-05 VITALS — WEIGHT: 175 LBS | BODY MASS INDEX: 31 KG/M2 | DIASTOLIC BLOOD PRESSURE: 70 MMHG | SYSTOLIC BLOOD PRESSURE: 120 MMHG

## 2023-12-05 DIAGNOSIS — Z34.83 PRENATAL CARE, SUBSEQUENT PREGNANCY IN THIRD TRIMESTER: Primary | ICD-10-CM

## 2023-12-05 PROBLEM — Z34.90 ENCOUNTER FOR ELECTIVE INDUCTION OF LABOR: Status: ACTIVE | Noted: 2023-12-05

## 2023-12-05 LAB
GLUCOSE UR STRIP-MCNC: NEGATIVE MG/DL
PROT UR STRIP-MCNC: NEGATIVE MG/DL

## 2023-12-05 RX ORDER — ASPIRIN 81 MG/1
81 TABLET ORAL DAILY
Qty: 30 TABLET | Refills: 1 | Status: SHIPPED | OUTPATIENT
Start: 2023-12-05

## 2023-12-05 RX ORDER — CEPHALEXIN 500 MG/1
CAPSULE ORAL
COMMUNITY
Start: 2023-12-01 | End: 2023-12-05

## 2023-12-05 NOTE — PROGRESS NOTES
Chief Complaint   Patient presents with    Routine Prenatal Visit     No c/c       HPI: Yaneth is a  currently at 36w2d who today reports the following:  Contractions - YES - but less than 4/hour AND no associated change in vaginal discharge; Leaking - No; Vaginal bleeding -  No; Swelling of extremities - No.    ROS:  GI: Nausea - No; Constipation - No; Diarrhea - No    Neuro: Headache - No; Visual change - No      EXAM:  Vitals: See prenatal flowsheet   Abdomen: See prenatal flowsheet   Urine glucose/protein: See prenatal flowsheet   Pelvic: See prenatal flowsheet   MDM:   Impression: Supervision of low risk pregnancy   Tests done today: none   Topics discussed: Continue with PNV's  Prenatal labs reviewed  Labor signs and symptoms  Symptoms of preeclampsia   Tests scheduled today for her next visit:   none

## 2023-12-12 ENCOUNTER — ROUTINE PRENATAL (OUTPATIENT)
Dept: OBSTETRICS AND GYNECOLOGY | Facility: CLINIC | Age: 23
End: 2023-12-12
Payer: COMMERCIAL

## 2023-12-12 VITALS — DIASTOLIC BLOOD PRESSURE: 66 MMHG | BODY MASS INDEX: 30.82 KG/M2 | SYSTOLIC BLOOD PRESSURE: 102 MMHG | WEIGHT: 174 LBS

## 2023-12-12 DIAGNOSIS — Z34.83 PRENATAL CARE, SUBSEQUENT PREGNANCY IN THIRD TRIMESTER: Primary | ICD-10-CM

## 2023-12-12 LAB
GLUCOSE UR STRIP-MCNC: NEGATIVE MG/DL
PROT UR STRIP-MCNC: NEGATIVE MG/DL

## 2023-12-12 RX ORDER — METRONIDAZOLE 500 MG/1
500 TABLET ORAL 2 TIMES DAILY
Qty: 14 TABLET | Refills: 0 | Status: SHIPPED | OUTPATIENT
Start: 2023-12-12 | End: 2023-12-19

## 2023-12-12 NOTE — PROGRESS NOTES
Chief Complaint   Patient presents with    Routine Prenatal Visit     C/o contractions every other hour lasting about a minute.        HPI: Yaneth is a  currently at 37w2d who today reports the following:  Contractions - YES - but less than 4/hour AND no associated change in vaginal discharge; Leaking - No; Vaginal bleeding -  No; Swelling of extremities - No.    ROS:  GI: Nausea - No; Constipation - No; Diarrhea - No    Neuro: Headache - No; Visual change - No      EXAM:  Vitals: See prenatal flowsheet   Abdomen: See prenatal flowsheet   Urine glucose/protein: See prenatal flowsheet   Pelvic: See prenatal flowsheet   MDM:   Impression: Supervision of low risk pregnancy   Tests done today: none   Topics discussed: Continue with PNV's  Prenatal labs reviewed  Labor signs and symptoms  Symptoms of preeclampsia  Rx for her flagyl and vaginal yeast cream sent to correct pharmacy.    Tests scheduled today for her next visit:   none     New Medications Ordered This Visit   Medications    metroNIDAZOLE (Flagyl) 500 MG tablet     Sig: Take 1 tablet by mouth 2 (Two) Times a Day for 7 days.     Dispense:  14 tablet     Refill:  0    miconazole (MICOTIN) 2 % vaginal cream     Sig: Insert one applicatorful (~ 5 g) at bedtime for 7 days     Dispense:  45 g     Refill:  0

## 2023-12-16 ENCOUNTER — HOSPITAL ENCOUNTER (OUTPATIENT)
Facility: HOSPITAL | Age: 23
Discharge: HOME | End: 2023-12-16
Attending: OBSTETRICS & GYNECOLOGY | Admitting: OBSTETRICS & GYNECOLOGY
Payer: COMMERCIAL

## 2023-12-16 VITALS
SYSTOLIC BLOOD PRESSURE: 111 MMHG | RESPIRATION RATE: 16 BRPM | DIASTOLIC BLOOD PRESSURE: 73 MMHG | TEMPERATURE: 98.1 F | HEART RATE: 82 BPM

## 2023-12-16 PROCEDURE — G0463 HOSPITAL OUTPT CLINIC VISIT: HCPCS

## 2023-12-16 RX ORDER — ACETAMINOPHEN 325 MG/1
500 TABLET ORAL AS NEEDED
COMMUNITY
End: 2023-12-19

## 2023-12-16 NOTE — H&P
"[facility]  Obstetric History and Physical    Chief Complaint   Patient presents with    Contractions       Subjective     Patient is a 22 y.o. female  currently at 37w6d, who presents with complaints of contractions and pelvic pressure.  She denies dysuria, vaginal bleeding or leakage of fluid.  Active fetal movement reported.    Her prenatal care is reportedly benign. Her previous obstetric/gynecological history is notable for 1 term vaginal delivery and 1 prior first trimester pregnancy loss.    The following portions of the patients history were reviewed and updated as appropriate: current medications, allergies, past medical history, past surgical history, past family history, past social history, and problem list .        Prenatal Information:   Maternal Prenatal Labs  Blood Type No results found for: \"ABO\"   Rh Status No results found for: \"RH\"   Antibody Screen No results found for: \"ABSCRN\"   Gonnorhea No results found for: \"GCCX\"   Chlamydia No results found for: \"CLAMYDCU\"   RPR No results found for: \"RPR\"   Syphilis Antibody No results found for: \"SYPHILIS\"   Rubella No results found for: \"RUBELLAIGGIN\"   Hepatitis B Surface Antigen No results found for: \"HEPBSAG\"   HIV-1 Antibody No results found for: \"LABHIV1\"   Hepatitis C Antibody No results found for: \"HEPCAB\"   Rapid Urin Drug Screen No results found for: \"AMPMETHU\", \"BARBITSCNUR\", \"LABBENZSCN\", \"LABMETHSCN\", \"LABOPIASCN\", \"THCURSCR\", \"COCAINEUR\", \"AMPHETSCREEN\", \"PROPOXSCN\", \"BUPRENORSCNU\", \"METAMPSCNUR\", \"OXYCODONESCN\", \"TRICYCLICSCN\"   Group B Strep Culture No results found for: \"GBSANTIGEN\"           External Prenatal Results       Pregnancy Outside Results - Transcribed From Office Records - See Scanned Records For Details       Test Value Date Time    ABO  O  23 1046    Rh  Positive  23 1046    Antibody Screen  Negative  23 1046    Varicella IgG       Rubella  1.18 index 23 1046    Hgb  12.3 g/dL 23 1153 "       12.8 g/dL 23 1046    Hct  35.2 % 23 1153       38.2 % 23 1046    Glucose Fasting GTT       Glucose Tolerance Test 1 hour       Glucose Tolerance Test 3 hour       Gonorrhea (discrete)  Negative  23 1000    Chlamydia (discrete)  Negative  23 1000    RPR  Non-Reactive  23 1046    VDRL       Syphilis Antibody       HBsAg  Non-Reactive  23 1046    Herpes Simplex Virus PCR       Herpes Simplex VIrus Culture       HIV  Non-Reactive  23 1046    Hep C RNA Quant PCR       Hep C Antibody  Non-Reactive  23 1046    AFP       Group B Strep ^ negative  23     GBS Susceptibility to Clindamycin       GBS Susceptibility to Erythromycin       Fetal Fibronectin       Genetic Testing, Maternal Blood                 Drug Screening       Test Value Date Time    Urine Drug Screen       Amphetamine Screen  Negative  23 1000    Barbiturate Screen  Negative  23 1000    Benzodiazepine Screen  Negative  23 1000    Methadone Screen  Negative  23 1000    Phencyclidine Screen  Negative  23 1000    Opiates Screen  Negative  23 1000    THC Screen  Negative  23 1000    Cocaine Screen       Propoxyphene Screen  Negative  23 1000    Buprenorphine Screen  Negative  23 1000    Methamphetamine Screen       Oxycodone Screen  Negative  23 1000    Tricyclic Antidepressants Screen  Negative  23 1000              Legend    ^: Historical                              Past OB History:       OB History    Para Term  AB Living   3 1 1 0 1 1   SAB IAB Ectopic Molar Multiple Live Births   1 0 0 0 0 1      # Outcome Date GA Lbr Saman/2nd Weight Sex Delivery Anes PTL Lv   3 Current            2 SAB  6w0d    SAB      1 Term 19 39w0d  3402 g (7 lb 8 oz) M Vag-Spont None  LAYLA      Obstetric Comments   No history of STIs   No abnormal paps in the past   Nurse Tech Supervisor/Registration    2019 - [facility]   FOB not  involved    Fob #1 - Pregnancy #1 - #3       Past Medical History:  History reviewed. No pertinent past medical history.   Past Surgical History Past Surgical History:   Procedure Laterality Date    TONSILLECTOMY      WISDOM TOOTH EXTRACTION        Family History: Family History   Problem Relation Age of Onset    Diabetes Maternal Grandmother     Breast cancer Maternal Grandmother     Hypertension Maternal Grandfather     Diabetes Maternal Grandfather     Breast cancer Maternal Great-Grandmother     Colon cancer Neg Hx     Ovarian cancer Neg Hx     Pancreatic cancer Neg Hx     Prostate cancer Neg Hx       Social History:  reports that she has never smoked. She has never used smokeless tobacco.   reports no history of alcohol use.   reports no history of drug use.   Allergies: Patient has no known allergies.  Current Medications:          No current facility-administered medications on file prior to encounter.     Current Outpatient Medications on File Prior to Encounter   Medication Sig Dispense Refill    acetaminophen (TYLENOL) 325 MG tablet Take 500 mg by mouth As Needed for Mild Pain.      metroNIDAZOLE (Flagyl) 500 MG tablet Take 1 tablet by mouth 2 (Two) Times a Day for 7 days. 14 tablet 0    miconazole (MICOTIN) 2 % vaginal cream Insert one applicatorful (~ 5 g) at bedtime for 7 days 45 g 0    Prenatal Vit-Fe Fumarate-FA (prenatal vitamin 28-0.8) 28-0.8 MG tablet tablet Take 1 tablet by mouth Daily. 30 tablet 5       General ROS: Pertinent items are noted in HPI, all other systems reviewed and negative    Objective       Vital Signs Range for the last 24 hours  Temperature: Temp:  [98.1 °F (36.7 °C)] 98.1 °F (36.7 °C)   Temp Source: Temp src: Oral   BP: BP: (111)/(73) 111/73   Pulse: Heart Rate:  [82] 82   Respirations: Resp:  [16] 16   SPO2:     O2 Amount (l/min):     O2 Devices     Weight:       Physical Examination: General appearance - alert, well appearing, and in no distress, oriented to person, place,  "and time, and overweight  Mental status - alert, oriented to person, place, and time, normal mood, behavior, speech, dress, motor activity, and thought processes  Eyes - pupils equal and reactive, extraocular eye movements intact, sclera anicteric  Neck - supple, no significant adenopathy, thyroid exam: thyroid is normal in size without nodules or tenderness  Chest - clear to auscultation, no wheezes, rales or rhonchi, symmetric air entry  Heart - normal rate, regular rhythm, normal S1, S2, no murmurs, rubs, clicks or gallops  Abdomen-soft, nontender, nondistended, no masses or organomegaly   Abdomen, Non-Tender  , CERVIX: Cervical exam per nursing staff.  Cervix 1 cm/thick/high.  Neurological - alert, oriented, normal speech, no focal findings or movement disorder noted, DTR's normal and symmetric  Extremities - no pedal edema noted    Fetal Heart Rate Assessment  Indication: False labor   Start Time: 1524                end Time: 1640   NST Results: Reactive NST.  Fetal heart rate baseline 125-135 bpm.  Moderate variability with 15 x 15 accelerations noted.  No decelerations.  Occasional contraction without regular pattern.        Laboratory Results:   Lab Results   Component Value Date    CREATININE 0.66 2022       No results found for: \"WBC\", \"RBC\", \"HGB\", \"HCT\", \"MCV\", \"MCH\", \"MCHC\", \"RDW\", \"RDWSD\", \"MPV\", \"PLT\", \"NEUTRORELPCT\", \"LYMPHORELPCT\", \"MONORELPCT\", \"EOSRELPCT\", \"BASORELPCT\", \"AUTOIGPER\", \"NEUTROABS\", \"LYMPHSABS\", \"MONOSABS\", \"EOSABS\", \"BASOSABS\", \"AUTOIGNUM\", \"NRBC\"          Brief Urine Lab Results  (Last result in the past 365 days)        Color   Clarity   Blood   Leuk Est   Nitrite   Protein   CREAT   Urine HCG        23 1034           Negative                     Radiology Review: No new studies  Other Studies: None    Assessment & Plan       * No active hospital problems. *        Assessment:  False labor at term.    Plan:  Discharge home.  Reviewed signs/symptoms of active " "labor and discussed instructions for fetal kick counts.  Keep regularly scheduled OB office visit.     Total time spent today with Yaneth  was 20-29 minutes (level 3).  Of this time, > 50% was spent face-to-face time coordinating care, answering her questions and counseling regarding pathophysiology of her presenting problem along with plans for any diagnostic work-up and treatment.        Sonu Oshea \"Emerson\" JESSICA Trujillo MD  12/16/2023  16:43 EST    "

## 2023-12-19 ENCOUNTER — ROUTINE PRENATAL (OUTPATIENT)
Dept: OBSTETRICS AND GYNECOLOGY | Facility: CLINIC | Age: 23
End: 2023-12-19
Payer: COMMERCIAL

## 2023-12-19 VITALS — DIASTOLIC BLOOD PRESSURE: 72 MMHG | WEIGHT: 174.2 LBS | SYSTOLIC BLOOD PRESSURE: 114 MMHG | BODY MASS INDEX: 30.86 KG/M2

## 2023-12-19 DIAGNOSIS — Z34.83 PRENATAL CARE, SUBSEQUENT PREGNANCY IN THIRD TRIMESTER: Primary | ICD-10-CM

## 2023-12-19 DIAGNOSIS — Z34.90 ENCOUNTER FOR ELECTIVE INDUCTION OF LABOR: ICD-10-CM

## 2023-12-19 LAB
GLUCOSE UR STRIP-MCNC: NEGATIVE MG/DL
PROT UR STRIP-MCNC: NEGATIVE MG/DL

## 2023-12-19 NOTE — PROGRESS NOTES
Chief Complaint   Patient presents with    Routine Prenatal Visit     No c/c, continued back pain       HPI: Yaneth is a  currently at 38w2d who today reports the following:  Contractions - YES - but less than 4/hour AND no associated change in vaginal discharge; Leaking - No; Vaginal bleeding -  No; Swelling of extremities - No.    ROS:  GI: Nausea - No; Constipation - No; Diarrhea - No    Neuro: Headache - No; Visual change - No      EXAM:  Vitals: See prenatal flowsheet   Abdomen: See prenatal flowsheet   Urine glucose/protein: See prenatal flowsheet   Pelvic: See prenatal flowsheet   MDM:   Impression: Supervision of low risk pregnancy   Tests done today: none   Topics discussed: Continue with PNV's  Prenatal labs reviewed  Labor signs and symptoms  Symptoms of preeclampsia   Tests scheduled today for her next visit:   none  She may desire membrane sweep at next visit.

## 2023-12-23 ENCOUNTER — HOSPITAL ENCOUNTER (INPATIENT)
Facility: HOSPITAL | Age: 23
LOS: 2 days | Discharge: HOME | End: 2023-12-25
Attending: OBSTETRICS & GYNECOLOGY | Admitting: OBSTETRICS & GYNECOLOGY
Payer: COMMERCIAL

## 2023-12-23 PROCEDURE — 25810000003 LACTATED RINGERS PER 1000 ML: Performed by: OBSTETRICS & GYNECOLOGY

## 2023-12-23 RX ORDER — METRONIDAZOLE 500 MG/1
500 TABLET ORAL 3 TIMES DAILY
COMMUNITY
End: 2023-12-25 | Stop reason: HOSPADM

## 2023-12-23 RX ADMIN — SODIUM CHLORIDE, POTASSIUM CHLORIDE, SODIUM LACTATE AND CALCIUM CHLORIDE 125 ML/HR: 600; 310; 30; 20 INJECTION, SOLUTION INTRAVENOUS at 23:58

## 2023-12-24 PROBLEM — Z37.9 NORMAL LABOR: Status: ACTIVE | Noted: 2023-12-24

## 2023-12-24 LAB
ABO GROUP BLD: NORMAL
ALP SERPL-CCNC: 241 U/L (ref 39–117)
ALT SERPL W P-5'-P-CCNC: 9 U/L (ref 1–33)
AST SERPL-CCNC: 16 U/L (ref 1–32)
BILIRUB SERPL-MCNC: 0.2 MG/DL (ref 0–1.2)
BLD GP AB SCN SERPL QL: NEGATIVE
CREAT SERPL-MCNC: 0.49 MG/DL (ref 0.57–1)
DEPRECATED RDW RBC AUTO: 37.5 FL (ref 37–54)
ERYTHROCYTE [DISTWIDTH] IN BLOOD BY AUTOMATED COUNT: 12.7 % (ref 12.3–15.4)
HCT VFR BLD AUTO: 37.4 % (ref 34–46.6)
HGB BLD-MCNC: 12.6 G/DL (ref 12–15.9)
LDH SERPL-CCNC: 212 U/L (ref 135–214)
MCH RBC QN AUTO: 27.8 PG (ref 26.6–33)
MCHC RBC AUTO-ENTMCNC: 33.7 G/DL (ref 31.5–35.7)
MCV RBC AUTO: 82.6 FL (ref 79–97)
PLATELET # BLD AUTO: 231 10*3/MM3 (ref 140–450)
PMV BLD AUTO: 12.3 FL (ref 6–12)
RBC # BLD AUTO: 4.53 10*6/MM3 (ref 3.77–5.28)
RH BLD: POSITIVE
T&S EXPIRATION DATE: NORMAL
URATE SERPL-MCNC: 3.4 MG/DL (ref 2.4–5.7)
WBC NRBC COR # BLD AUTO: 9.52 10*3/MM3 (ref 3.4–10.8)

## 2023-12-24 PROCEDURE — 25010000002 METHYLERGONOVINE MALEATE PER 0.2 MG

## 2023-12-24 PROCEDURE — 83615 LACTATE (LD) (LDH) ENZYME: CPT | Performed by: OBSTETRICS & GYNECOLOGY

## 2023-12-24 PROCEDURE — 86850 RBC ANTIBODY SCREEN: CPT | Performed by: OBSTETRICS & GYNECOLOGY

## 2023-12-24 PROCEDURE — 82247 BILIRUBIN TOTAL: CPT | Performed by: OBSTETRICS & GYNECOLOGY

## 2023-12-24 PROCEDURE — 84075 ASSAY ALKALINE PHOSPHATASE: CPT | Performed by: OBSTETRICS & GYNECOLOGY

## 2023-12-24 PROCEDURE — 25010000002 METHYLERGONOVINE MALEATE PER 0.2 MG: Performed by: OBSTETRICS & GYNECOLOGY

## 2023-12-24 PROCEDURE — 84550 ASSAY OF BLOOD/URIC ACID: CPT | Performed by: OBSTETRICS & GYNECOLOGY

## 2023-12-24 PROCEDURE — 25810000003 LACTATED RINGERS PER 1000 ML: Performed by: OBSTETRICS & GYNECOLOGY

## 2023-12-24 PROCEDURE — 84460 ALANINE AMINO (ALT) (SGPT): CPT | Performed by: OBSTETRICS & GYNECOLOGY

## 2023-12-24 PROCEDURE — 82565 ASSAY OF CREATININE: CPT | Performed by: OBSTETRICS & GYNECOLOGY

## 2023-12-24 PROCEDURE — 10H07YZ INSERTION OF OTHER DEVICE INTO PRODUCTS OF CONCEPTION, VIA NATURAL OR ARTIFICIAL OPENING: ICD-10-PCS | Performed by: OBSTETRICS & GYNECOLOGY

## 2023-12-24 PROCEDURE — 25810000003 LACTATED RINGERS SOLUTION: Performed by: OBSTETRICS & GYNECOLOGY

## 2023-12-24 PROCEDURE — 86900 BLOOD TYPING SEROLOGIC ABO: CPT | Performed by: OBSTETRICS & GYNECOLOGY

## 2023-12-24 PROCEDURE — 84450 TRANSFERASE (AST) (SGOT): CPT | Performed by: OBSTETRICS & GYNECOLOGY

## 2023-12-24 PROCEDURE — 85027 COMPLETE CBC AUTOMATED: CPT | Performed by: OBSTETRICS & GYNECOLOGY

## 2023-12-24 PROCEDURE — 59410 OBSTETRICAL CARE: CPT | Performed by: OBSTETRICS & GYNECOLOGY

## 2023-12-24 PROCEDURE — 86901 BLOOD TYPING SEROLOGIC RH(D): CPT | Performed by: OBSTETRICS & GYNECOLOGY

## 2023-12-24 RX ORDER — OXYTOCIN/0.9 % SODIUM CHLORIDE 30/500 ML
250 PLASTIC BAG, INJECTION (ML) INTRAVENOUS CONTINUOUS
Status: DISPENSED | OUTPATIENT
Start: 2023-12-24 | End: 2023-12-24

## 2023-12-24 RX ORDER — HYDROCORTISONE 25 MG/G
1 CREAM TOPICAL AS NEEDED
Status: DISCONTINUED | OUTPATIENT
Start: 2023-12-24 | End: 2023-12-25 | Stop reason: HOSPADM

## 2023-12-24 RX ORDER — OXYTOCIN/0.9 % SODIUM CHLORIDE 30/500 ML
999 PLASTIC BAG, INJECTION (ML) INTRAVENOUS ONCE
Status: COMPLETED | OUTPATIENT
Start: 2023-12-24 | End: 2023-12-24

## 2023-12-24 RX ORDER — METHYLERGONOVINE MALEATE 0.2 MG/ML
INJECTION INTRAVENOUS
Status: COMPLETED
Start: 2023-12-24 | End: 2023-12-24

## 2023-12-24 RX ORDER — MORPHINE SULFATE 2 MG/ML
2 INJECTION, SOLUTION INTRAMUSCULAR; INTRAVENOUS
Status: DISCONTINUED | OUTPATIENT
Start: 2023-12-24 | End: 2023-12-24 | Stop reason: HOSPADM

## 2023-12-24 RX ORDER — ONDANSETRON 2 MG/ML
4 INJECTION INTRAMUSCULAR; INTRAVENOUS EVERY 6 HOURS PRN
Status: DISCONTINUED | OUTPATIENT
Start: 2023-12-24 | End: 2023-12-24 | Stop reason: HOSPADM

## 2023-12-24 RX ORDER — LIDOCAINE HYDROCHLORIDE 10 MG/ML
0.5 INJECTION, SOLUTION EPIDURAL; INFILTRATION; INTRACAUDAL; PERINEURAL ONCE AS NEEDED
Status: DISCONTINUED | OUTPATIENT
Start: 2023-12-24 | End: 2023-12-24 | Stop reason: HOSPADM

## 2023-12-24 RX ORDER — MISOPROSTOL 200 UG/1
800 TABLET ORAL AS NEEDED
Status: DISCONTINUED | OUTPATIENT
Start: 2023-12-24 | End: 2023-12-24 | Stop reason: HOSPADM

## 2023-12-24 RX ORDER — OXYTOCIN/0.9 % SODIUM CHLORIDE 30/500 ML
125 PLASTIC BAG, INJECTION (ML) INTRAVENOUS CONTINUOUS PRN
Status: DISCONTINUED | OUTPATIENT
Start: 2023-12-24 | End: 2023-12-25 | Stop reason: HOSPADM

## 2023-12-24 RX ORDER — BISACODYL 10 MG
10 SUPPOSITORY, RECTAL RECTAL DAILY PRN
Status: DISCONTINUED | OUTPATIENT
Start: 2023-12-25 | End: 2023-12-25 | Stop reason: HOSPADM

## 2023-12-24 RX ORDER — CARBOPROST TROMETHAMINE 250 UG/ML
250 INJECTION, SOLUTION INTRAMUSCULAR AS NEEDED
Status: DISCONTINUED | OUTPATIENT
Start: 2023-12-24 | End: 2023-12-24 | Stop reason: HOSPADM

## 2023-12-24 RX ORDER — SODIUM CHLORIDE 0.9 % (FLUSH) 0.9 %
10 SYRINGE (ML) INJECTION AS NEEDED
Status: DISCONTINUED | OUTPATIENT
Start: 2023-12-24 | End: 2023-12-24 | Stop reason: HOSPADM

## 2023-12-24 RX ORDER — SODIUM CHLORIDE 0.9 % (FLUSH) 0.9 %
1-10 SYRINGE (ML) INJECTION AS NEEDED
Status: DISCONTINUED | OUTPATIENT
Start: 2023-12-24 | End: 2023-12-25 | Stop reason: HOSPADM

## 2023-12-24 RX ORDER — SODIUM CHLORIDE 9 MG/ML
40 INJECTION, SOLUTION INTRAVENOUS AS NEEDED
Status: DISCONTINUED | OUTPATIENT
Start: 2023-12-24 | End: 2023-12-24 | Stop reason: HOSPADM

## 2023-12-24 RX ORDER — MAGNESIUM CARB/ALUMINUM HYDROX 105-160MG
30 TABLET,CHEWABLE ORAL ONCE
Status: DISCONTINUED | OUTPATIENT
Start: 2023-12-24 | End: 2023-12-24 | Stop reason: HOSPADM

## 2023-12-24 RX ORDER — HYDROCODONE BITARTRATE AND ACETAMINOPHEN 5; 325 MG/1; MG/1
1 TABLET ORAL EVERY 4 HOURS PRN
Status: DISCONTINUED | OUTPATIENT
Start: 2023-12-24 | End: 2023-12-25 | Stop reason: HOSPADM

## 2023-12-24 RX ORDER — IBUPROFEN 600 MG/1
600 TABLET ORAL EVERY 6 HOURS PRN
Status: DISCONTINUED | OUTPATIENT
Start: 2023-12-24 | End: 2023-12-25 | Stop reason: HOSPADM

## 2023-12-24 RX ORDER — DIPHENHYDRAMINE HCL 25 MG
25 CAPSULE ORAL NIGHTLY PRN
Status: DISCONTINUED | OUTPATIENT
Start: 2023-12-24 | End: 2023-12-25 | Stop reason: HOSPADM

## 2023-12-24 RX ORDER — DOCUSATE SODIUM 100 MG/1
100 CAPSULE, LIQUID FILLED ORAL 2 TIMES DAILY
Status: DISCONTINUED | OUTPATIENT
Start: 2023-12-24 | End: 2023-12-25 | Stop reason: HOSPADM

## 2023-12-24 RX ORDER — ACETAMINOPHEN 325 MG/1
650 TABLET ORAL EVERY 6 HOURS PRN
Status: DISCONTINUED | OUTPATIENT
Start: 2023-12-24 | End: 2023-12-25 | Stop reason: HOSPADM

## 2023-12-24 RX ORDER — SODIUM CHLORIDE 0.9 % (FLUSH) 0.9 %
10 SYRINGE (ML) INJECTION EVERY 12 HOURS SCHEDULED
Status: DISCONTINUED | OUTPATIENT
Start: 2023-12-24 | End: 2023-12-24 | Stop reason: HOSPADM

## 2023-12-24 RX ORDER — METHYLERGONOVINE MALEATE 0.2 MG/ML
200 INJECTION INTRAVENOUS ONCE AS NEEDED
Status: COMPLETED | OUTPATIENT
Start: 2023-12-24 | End: 2023-12-24

## 2023-12-24 RX ORDER — SODIUM CHLORIDE, SODIUM LACTATE, POTASSIUM CHLORIDE, CALCIUM CHLORIDE 600; 310; 30; 20 MG/100ML; MG/100ML; MG/100ML; MG/100ML
75 INJECTION, SOLUTION INTRAVENOUS CONTINUOUS
Status: DISCONTINUED | OUTPATIENT
Start: 2023-12-24 | End: 2023-12-25

## 2023-12-24 RX ORDER — OXYTOCIN/0.9 % SODIUM CHLORIDE 30/500 ML
2-20 PLASTIC BAG, INJECTION (ML) INTRAVENOUS
Status: DISCONTINUED | OUTPATIENT
Start: 2023-12-24 | End: 2023-12-25 | Stop reason: HOSPADM

## 2023-12-24 RX ORDER — ONDANSETRON 4 MG/1
4 TABLET, ORALLY DISINTEGRATING ORAL EVERY 6 HOURS PRN
Status: DISCONTINUED | OUTPATIENT
Start: 2023-12-24 | End: 2023-12-24 | Stop reason: HOSPADM

## 2023-12-24 RX ORDER — SODIUM CHLORIDE, SODIUM LACTATE, POTASSIUM CHLORIDE, CALCIUM CHLORIDE 600; 310; 30; 20 MG/100ML; MG/100ML; MG/100ML; MG/100ML
125 INJECTION, SOLUTION INTRAVENOUS CONTINUOUS
Status: DISCONTINUED | OUTPATIENT
Start: 2023-12-24 | End: 2023-12-25 | Stop reason: HOSPADM

## 2023-12-24 RX ORDER — HYDROCODONE BITARTRATE AND ACETAMINOPHEN 5; 325 MG/1; MG/1
2 TABLET ORAL EVERY 6 HOURS PRN
Status: DISCONTINUED | OUTPATIENT
Start: 2023-12-24 | End: 2023-12-25 | Stop reason: HOSPADM

## 2023-12-24 RX ORDER — ACETAMINOPHEN 325 MG/1
650 TABLET ORAL EVERY 4 HOURS PRN
Status: DISCONTINUED | OUTPATIENT
Start: 2023-12-24 | End: 2023-12-24 | Stop reason: HOSPADM

## 2023-12-24 RX ADMIN — Medication 250 ML/HR: at 15:57

## 2023-12-24 RX ADMIN — Medication 999 ML/HR: at 15:40

## 2023-12-24 RX ADMIN — METHYLERGONOVINE MALEATE 200 MCG: 0.2 INJECTION, SOLUTION INTRAMUSCULAR; INTRAVENOUS at 15:44

## 2023-12-24 RX ADMIN — ACETAMINOPHEN 650 MG: 325 TABLET ORAL at 20:23

## 2023-12-24 RX ADMIN — IBUPROFEN 600 MG: 600 TABLET, FILM COATED ORAL at 17:33

## 2023-12-24 RX ADMIN — DOCUSATE SODIUM 100 MG: 100 CAPSULE, LIQUID FILLED ORAL at 20:22

## 2023-12-24 RX ADMIN — Medication: at 18:20

## 2023-12-24 RX ADMIN — Medication 250 ML/HR: at 16:56

## 2023-12-24 RX ADMIN — WITCH HAZEL: 500 SOLUTION RECTAL; TOPICAL at 18:20

## 2023-12-24 RX ADMIN — IBUPROFEN 600 MG: 600 TABLET, FILM COATED ORAL at 23:44

## 2023-12-24 RX ADMIN — SODIUM CHLORIDE, POTASSIUM CHLORIDE, SODIUM LACTATE AND CALCIUM CHLORIDE 500 ML: 600; 310; 30; 20 INJECTION, SOLUTION INTRAVENOUS at 15:16

## 2023-12-24 RX ADMIN — Medication 2 MILLI-UNITS/MIN: at 10:26

## 2023-12-24 RX ADMIN — METHYLERGONOVINE MALEATE 200 MCG: 0.2 INJECTION, SOLUTION INTRAMUSCULAR; INTRAVENOUS at 15:42

## 2023-12-24 RX ADMIN — SODIUM CHLORIDE, POTASSIUM CHLORIDE, SODIUM LACTATE AND CALCIUM CHLORIDE 125 ML/HR: 600; 310; 30; 20 INJECTION, SOLUTION INTRAVENOUS at 10:24

## 2023-12-24 RX ADMIN — Medication 1 APPLICATION: at 18:20

## 2023-12-24 NOTE — L&D DELIVERY NOTE
" [facility]   Vaginal Delivery Note    Patient Name: Yaneth Garcias  : 2000  MRN: [id number]    Date of Delivery: 2023     Diagnosis     Pre & Post-Delivery:  Intrauterine pregnancy at 39w0d  Labor status: Spontaneous Onset of Labor     Normal labor             Problem List    Transfer to Postpartum     Review the Delivery Report for details.     Delivery     Delivery: Vaginal, Spontaneous     YOB: 2023    Time of Birth:  Gestational Age 3:33 PM   39w0d     Anesthesia:      Delivering clinician: Anupam Wong    Forceps?   No   Vacuum? No    Shoulder dystocia present: No        Delivery narrative: I was called to attend delivery and Dr. Edwards absence.  Upon my arrival  delivered attended by nursing staff over intact perineum.  Without nuchal cord.  Mouth and nares were suctioned by the staff and cord clamped after 1 minute delay.  Cord blood obtained.  Placenta expressed spontaneously.  A 3 cm portion of membranes was teased out of the cervix with ring forceps.  No cervical lacerations noted.  Uterus cleared of all clots and debris.  Perineum intact.  Estimated blood loss 250 cc.  Mom and  stable postpartum.      Infant     Findings: female  infant     Infant observations: Weight: 2785 g (6 lb 2.2 oz)   Length: 19  in  Observations/Comments:        Apgars: 7  @ 1 minute /    9  @ 5 minutes   Infant Name:      Placenta & Cord         Placenta delivered  Spontaneous  at   2023  3:42 PM     Cord: 3 vessels  present.   Nuchal Cord?  no   Cord blood obtained: Yes    Cord gases obtained:  No    Cord gas results: Venous:  No results found for: \"PHCVEN\", \"BECVEN\"    Arterial:  No results found for: \"PHCART\", \"BECART\"     Repair     Episiotomy: Not recorded     No    Lacerations: No   Estimated Blood Loss:  250 cc.     Quantitative Blood Loss:          Complications     none    Disposition     Mother to Mother Baby/Postpartum  in stable condition " currently.  Baby to NBN  in stable condition currently.    Anupam Wong,   12/24/23  16:10 EST
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
## 2023-12-25 VITALS
HEART RATE: 80 BPM | RESPIRATION RATE: 16 BRPM | DIASTOLIC BLOOD PRESSURE: 56 MMHG | WEIGHT: 174 LBS | TEMPERATURE: 97.9 F | SYSTOLIC BLOOD PRESSURE: 106 MMHG | BODY MASS INDEX: 30.83 KG/M2 | HEIGHT: 63 IN

## 2023-12-25 PROBLEM — Z37.9 NORMAL LABOR: Status: RESOLVED | Noted: 2023-12-24 | Resolved: 2023-12-25

## 2023-12-25 LAB
BASOPHILS # BLD AUTO: 0.01 10*3/MM3 (ref 0–0.2)
BASOPHILS NFR BLD AUTO: 0.1 % (ref 0–1.5)
DEPRECATED RDW RBC AUTO: 38.5 FL (ref 37–54)
EOSINOPHIL # BLD AUTO: 0.1 10*3/MM3 (ref 0–0.4)
EOSINOPHIL NFR BLD AUTO: 1 % (ref 0.3–6.2)
ERYTHROCYTE [DISTWIDTH] IN BLOOD BY AUTOMATED COUNT: 12.9 % (ref 12.3–15.4)
HCT VFR BLD AUTO: 29.1 % (ref 34–46.6)
HGB BLD-MCNC: 9.7 G/DL (ref 12–15.9)
IMM GRANULOCYTES # BLD AUTO: 0.05 10*3/MM3 (ref 0–0.05)
IMM GRANULOCYTES NFR BLD AUTO: 0.5 % (ref 0–0.5)
LYMPHOCYTES # BLD AUTO: 2.42 10*3/MM3 (ref 0.7–3.1)
LYMPHOCYTES NFR BLD AUTO: 23.8 % (ref 19.6–45.3)
MCH RBC QN AUTO: 28.1 PG (ref 26.6–33)
MCHC RBC AUTO-ENTMCNC: 33.3 G/DL (ref 31.5–35.7)
MCV RBC AUTO: 84.3 FL (ref 79–97)
MONOCYTES # BLD AUTO: 0.66 10*3/MM3 (ref 0.1–0.9)
MONOCYTES NFR BLD AUTO: 6.5 % (ref 5–12)
NEUTROPHILS NFR BLD AUTO: 6.93 10*3/MM3 (ref 1.7–7)
NEUTROPHILS NFR BLD AUTO: 68.1 % (ref 42.7–76)
NRBC BLD AUTO-RTO: 0 /100 WBC (ref 0–0.2)
PLATELET # BLD AUTO: 175 10*3/MM3 (ref 140–450)
PMV BLD AUTO: 11.8 FL (ref 6–12)
RBC # BLD AUTO: 3.45 10*6/MM3 (ref 3.77–5.28)
WBC NRBC COR # BLD AUTO: 10.17 10*3/MM3 (ref 3.4–10.8)

## 2023-12-25 PROCEDURE — 85025 COMPLETE CBC W/AUTO DIFF WBC: CPT | Performed by: OBSTETRICS & GYNECOLOGY

## 2023-12-25 PROCEDURE — 0503F POSTPARTUM CARE VISIT: CPT | Performed by: OBSTETRICS & GYNECOLOGY

## 2023-12-25 RX ADMIN — IBUPROFEN 600 MG: 600 TABLET, FILM COATED ORAL at 12:01

## 2023-12-25 RX ADMIN — DOCUSATE SODIUM 100 MG: 100 CAPSULE, LIQUID FILLED ORAL at 08:50

## 2023-12-25 RX ADMIN — ACETAMINOPHEN 650 MG: 325 TABLET ORAL at 08:49

## 2023-12-25 RX ADMIN — IBUPROFEN 600 MG: 600 TABLET, FILM COATED ORAL at 05:56

## 2023-12-25 RX ADMIN — ACETAMINOPHEN 650 MG: 325 TABLET ORAL at 16:20

## 2023-12-25 NOTE — SIGNIFICANT NOTE
12/25/23 1415   Maternal Information   Date of Referral 12/25/23   Person Making Referral lactation consultant  (courtesy visit, newly postpartum)   Maternal Reason for Referral other (see comments)  (pt reports she  first child; had mastitis in right breast)   Maternal Assessment   Breast Size Issue none   Breast Shape Bilateral:;round   Breast Density Bilateral:;soft   Nipples Bilateral:;short   Left Nipple Symptoms intact;nontender   Right Nipple Symptoms bleeding;cracked;painful   Maternal Infant Feeding   Maternal Emotional State independent;receptive;relaxed   Infant Positioning cross-cradle  (right)   Signs of Milk Transfer deep jaw excursions noted  (during suckle assessment; strong biting noted, suckle slightly obtained with bilateral cheek and chin support; mostly clamping still)   Pain with Feeding yes  (pt reports latch is comfortable with small nipple shield)   Pain Location nipple, right   Pain Description sharp   Comfort Measures Before/During Feeding infant position adjusted;latch adjusted;maternal position adjusted;suction broken using finger;other (see comments)  (small nipple shield with proper placement education provided)   Milk Ejection Reflex other (see comments)  (pt reports she feels a sensation in both breasts when baby is breastfeeding on one side; asked about haakaa for opposite breast; discussed letdown sensation and haakaa for collecting leaking milk)   Comfort Measures Following Feeding other (see comments)  (soft shells for sore nipples provided)   Latch Assistance minimal assistance   Milk Expression/Equipment   Breast Pump Type double electric, personal  (Zomee)   Breast Pumping   Breast Pumping Interventions post-feed pumping encouraged  (for short/missed feedings, if supplementation is required, or if breastfeeding becomes too painful, to encourage breastmilk production)     Completed breastfeeding education encouraging pt to achieve a deep, comfortable latch  throughout breastfeeding, which should be at least every 3 hours while giving baby stimulation for high quality transfer of breastmilk. Alternatively, pumping encouraged every three hours, or at baby's feeding times for optimal milk initiation/production. All questions answered at this time, PRN Lactation Consultant/Clinic contact encouraged.

## 2023-12-25 NOTE — DISCHARGE SUMMARY
Discharge Summary    Date of Admission: 2023  Date of Discharge:  2023      Patient: Yaneth Garcias      MR#:[id number]    Delivery Provider: Anupam Wong     Discharge Surgeon/OB: Minnie Carranza    Presenting Problem/History of Present Illness  Normal labor [O80, Z37.9]        (spontaneous vaginal delivery)         Discharge Diagnosis: Vaginal delivery at 39w0d    Procedures:  Vaginal, Spontaneous     2023    3:33 PM        Discharge Date: 2023;     Hospital Course  Patient is a 23 y.o. female  at 39w0d status post vaginal delivery without complication. Postpartum the patient did well. She remained afebrile, with vital signs stable. She was ready for discharge on postpartum day 1 and peds was OK with discharge later in day if labs ok.    Infant:   female  fetus 2785 g (6 lb 2.2 oz)  with Apgar scores of 7 , 9  at five minutes.    Condition on Discharge:  Stable    Vital Signs  Temp:  [97.8 °F (36.6 °C)-98.8 °F (37.1 °C)] 98.3 °F (36.8 °C)  Heart Rate:  [] 79  Resp:  [16-18] 16  BP: ()/(51-69) 104/61    Lab Results   Component Value Date    WBC 10.17 2023    HGB 9.7 (L) 2023    HCT 29.1 (L) 2023    MCV 84.3 2023     2023       Discharge Disposition  Home or Self Care    Discharge Medications     Discharge Medications        Continue These Medications        Instructions Start Date   prenatal vitamin 28-0.8 28-0.8 MG tablet tablet   1 tablet, Oral, Daily             Stop These Medications      metroNIDAZOLE 500 MG tablet  Commonly known as: FLAGYL     miconazole 2 % vaginal cream  Commonly known as: MICOTIN              Discharge Diet:     Activity at Discharge:   Activity Instructions       Activity as Tolerated      Pelvic Rest      Pelvic rest including no tampons, tub baths, or intercourse until 6 weeks postpartum/cleared by Provider.            Follow-up Appointments  Future Appointments   Date Time Provider Department  [place]   12/27/2023  8:00 AM Koki Gerard CNM [facility]   2/14/2024  1:00 PM Juli Esquivel MD [facility]     Additional Instructions for the Follow-ups that You Need to Schedule       Call MD for problems / concerns.   As directed      Discharge Follow-up with Specialty: Alvin; 6 Weeks   As directed      Specialty: [facility]   Follow Up: 6 Weeks                Minnie Carranza MD  12/25/23  10:38 EST  [facility]

## 2023-12-27 ENCOUNTER — HOSPITAL ENCOUNTER (EMERGENCY)
Facility: HOSPITAL | Age: 23
Discharge: LEFT BEFORE DISCHARGE | End: 2023-12-27
Payer: COMMERCIAL

## 2023-12-27 PROCEDURE — 99211 OFF/OP EST MAY X REQ PHY/QHP: CPT

## 2023-12-28 ENCOUNTER — HOSPITAL ENCOUNTER (EMERGENCY)
Facility: HOSPITAL | Age: 23
Discharge: HOME | End: 2023-12-28
Attending: STUDENT IN AN ORGANIZED HEALTH CARE EDUCATION/TRAINING PROGRAM
Payer: COMMERCIAL

## 2023-12-28 VITALS
TEMPERATURE: 97.8 F | SYSTOLIC BLOOD PRESSURE: 113 MMHG | DIASTOLIC BLOOD PRESSURE: 73 MMHG | WEIGHT: 174 LBS | OXYGEN SATURATION: 100 % | HEART RATE: 94 BPM | RESPIRATION RATE: 16 BRPM | HEIGHT: 63 IN | BODY MASS INDEX: 30.83 KG/M2

## 2023-12-28 DIAGNOSIS — N61.0 ACUTE MASTITIS: Primary | ICD-10-CM

## 2023-12-28 DIAGNOSIS — O91.23 NONPURULENT MASTITIS ASSOCIATED WITH LACTATION: ICD-10-CM

## 2023-12-28 LAB
ALBUMIN SERPL-MCNC: 3.4 G/DL (ref 3.5–5.2)
ALBUMIN/GLOB SERPL: 1.4 G/DL
ALP SERPL-CCNC: 186 U/L (ref 39–117)
ALT SERPL W P-5'-P-CCNC: 27 U/L (ref 1–33)
ANION GAP SERPL CALCULATED.3IONS-SCNC: 9 MMOL/L (ref 5–15)
AST SERPL-CCNC: 29 U/L (ref 1–32)
BASOPHILS # BLD AUTO: 0.01 10*3/MM3 (ref 0–0.2)
BASOPHILS NFR BLD AUTO: 0.1 % (ref 0–1.5)
BILIRUB SERPL-MCNC: 0.3 MG/DL (ref 0–1.2)
BUN SERPL-MCNC: 5 MG/DL (ref 6–20)
BUN/CREAT SERPL: 11.9 (ref 7–25)
CALCIUM SPEC-SCNC: 8.4 MG/DL (ref 8.6–10.5)
CHLORIDE SERPL-SCNC: 109 MMOL/L (ref 98–107)
CO2 SERPL-SCNC: 24 MMOL/L (ref 22–29)
CREAT SERPL-MCNC: 0.42 MG/DL (ref 0.57–1)
DEPRECATED RDW RBC AUTO: 40.9 FL (ref 37–54)
EGFRCR SERPLBLD CKD-EPI 2021: 141.2 ML/MIN/1.73
EOSINOPHIL # BLD AUTO: 0.18 10*3/MM3 (ref 0–0.4)
EOSINOPHIL NFR BLD AUTO: 2.2 % (ref 0.3–6.2)
ERYTHROCYTE [DISTWIDTH] IN BLOOD BY AUTOMATED COUNT: 13.3 % (ref 12.3–15.4)
GLOBULIN UR ELPH-MCNC: 2.5 GM/DL
GLUCOSE SERPL-MCNC: 85 MG/DL (ref 65–99)
HCT VFR BLD AUTO: 34.7 % (ref 34–46.6)
HGB BLD-MCNC: 11.3 G/DL (ref 12–15.9)
IMM GRANULOCYTES # BLD AUTO: 0.04 10*3/MM3 (ref 0–0.05)
IMM GRANULOCYTES NFR BLD AUTO: 0.5 % (ref 0–0.5)
LYMPHOCYTES # BLD AUTO: 0.87 10*3/MM3 (ref 0.7–3.1)
LYMPHOCYTES NFR BLD AUTO: 10.6 % (ref 19.6–45.3)
MCH RBC QN AUTO: 28 PG (ref 26.6–33)
MCHC RBC AUTO-ENTMCNC: 32.6 G/DL (ref 31.5–35.7)
MCV RBC AUTO: 85.9 FL (ref 79–97)
MONOCYTES # BLD AUTO: 0.43 10*3/MM3 (ref 0.1–0.9)
MONOCYTES NFR BLD AUTO: 5.3 % (ref 5–12)
NEUTROPHILS NFR BLD AUTO: 6.64 10*3/MM3 (ref 1.7–7)
NEUTROPHILS NFR BLD AUTO: 81.3 % (ref 42.7–76)
NRBC BLD AUTO-RTO: 0 /100 WBC (ref 0–0.2)
PLATELET # BLD AUTO: 225 10*3/MM3 (ref 140–450)
PMV BLD AUTO: 11.4 FL (ref 6–12)
POTASSIUM SERPL-SCNC: 3.8 MMOL/L (ref 3.5–5.2)
PROT SERPL-MCNC: 5.9 G/DL (ref 6–8.5)
RBC # BLD AUTO: 4.04 10*6/MM3 (ref 3.77–5.28)
SODIUM SERPL-SCNC: 142 MMOL/L (ref 136–145)
WBC NRBC COR # BLD AUTO: 8.17 10*3/MM3 (ref 3.4–10.8)

## 2023-12-28 PROCEDURE — 80053 COMPREHEN METABOLIC PANEL: CPT | Performed by: NURSE PRACTITIONER

## 2023-12-28 PROCEDURE — 25810000003 SODIUM CHLORIDE 0.9 % SOLUTION: Performed by: NURSE PRACTITIONER

## 2023-12-28 PROCEDURE — 99283 EMERGENCY DEPT VISIT LOW MDM: CPT

## 2023-12-28 PROCEDURE — 85025 COMPLETE CBC W/AUTO DIFF WBC: CPT | Performed by: NURSE PRACTITIONER

## 2023-12-28 RX ORDER — CLINDAMYCIN HYDROCHLORIDE 300 MG/1
300 CAPSULE ORAL 4 TIMES DAILY
Qty: 40 CAPSULE | Refills: 0 | Status: SHIPPED | OUTPATIENT
Start: 2023-12-28

## 2023-12-28 RX ORDER — SODIUM CHLORIDE 0.9 % (FLUSH) 0.9 %
10 SYRINGE (ML) INJECTION AS NEEDED
Status: DISCONTINUED | OUTPATIENT
Start: 2023-12-28 | End: 2023-12-28 | Stop reason: HOSPADM

## 2023-12-28 RX ADMIN — SODIUM CHLORIDE 1000 ML: 9 INJECTION, SOLUTION INTRAVENOUS at 10:50

## 2023-12-28 NOTE — LACTATION NOTE
Consult placed for patient seen in ER for possible mastitis. Pt states she has a history of mastitis with abscess drainage with last baby now 4 years old. Patient is 4 days postpartum & currently nursing her infant daughter. Pt states her infant nurses well on the L but not her R breast.  Upon assessing her breasts she is noted to have moderate engorgement bilaterally however pt c/o more tenderness in her right breast. Tenderness is more pronounced in the 3 o'clock region along with bottom side of right breast. Slight erythema noted on the R breast in the same region as her prior abscess which was drained. Pt allowed me to massage breasts in circular motion working towards the nipples to allow for milk flow. After milk began to flow I set up hospital breast pump & helped pt pump off 50 mls of milk from L & 18mls of milk from her R breasts. Areas of concern were more soft & pt noted it as less tender. I encouraged pt to nurse frequently starting with R breast first. I also encouraged her to use heat before nursing to get milk flowing then end with ice packs after nursing her infant. I also encouraged pt to take prescribed Motrin as needed. I recommended pt call outpatient lactation clinic for a follow up appointment. Engorgement handout given to patient & I reviewed s/s of mastitis. Pt to call her OBGYN if she has further issues.    Epi RN, RNC-MNN, CLC

## 2023-12-28 NOTE — ED PROVIDER NOTES
Subjective   History of Present Illness  Pleasant patient who presents the ER for right breast engorgement for the last several days.  Recently postpartum.  Baby doing okay.  OB/GYN Dr. Esquivel.  History of mastitis in the past with previous preg. No fever or chills. No cp or soa. Pt is still breast feeding. She was able to breast feed her previous child x 3 years.        Review of Systems    History reviewed. No pertinent past medical history.    No Known Allergies    Past Surgical History:   Procedure Laterality Date    TONSILLECTOMY      WISDOM TOOTH EXTRACTION         Family History   Problem Relation Age of Onset    Diabetes Maternal Grandmother     Breast cancer Maternal Grandmother     Hypertension Maternal Grandfather     Diabetes Maternal Grandfather     Breast cancer Maternal Great-Grandmother     Colon cancer Neg Hx     Ovarian cancer Neg Hx     Pancreatic cancer Neg Hx     Prostate cancer Neg Hx        Social History     Socioeconomic History    Marital status:      Spouse name: Vega    Number of children: 1    Highest education level: Bachelor's degree (e.g., BA, AB, BS)   Tobacco Use    Smoking status: Never    Smokeless tobacco: Never   Vaping Use    Vaping Use: Never used   Substance and Sexual Activity    Alcohol use: Never    Drug use: Never    Sexual activity: Yes     Partners: Male           Objective   Physical Exam  Constitutional:       Appearance: She is well-developed.   HENT:      Head: Normocephalic and atraumatic.      Right Ear: External ear normal.      Left Ear: External ear normal.      Nose: Nose normal.   Eyes:      Conjunctiva/sclera: Conjunctivae normal.      Pupils: Pupils are equal, round, and reactive to light.   Cardiovascular:      Rate and Rhythm: Normal rate and regular rhythm.      Heart sounds: Normal heart sounds.   Pulmonary:      Effort: Pulmonary effort is normal.      Breath sounds: Normal breath sounds.   Abdominal:      General: Bowel sounds are normal.       Palpations: Abdomen is soft.   Musculoskeletal:         General: Normal range of motion.      Cervical back: Normal range of motion and neck supple.   Skin:     General: Skin is warm and dry.      Comments: Right engorged breast.  With erythema noted around 1 and 2:00.  No streaking.  Mild to moderate tenderness noted.  No palpable abscess.   Neurological:      Mental Status: She is alert and oriented to person, place, and time.   Psychiatric:         Behavior: Behavior normal.         Thought Content: Thought content normal.         Judgment: Judgment normal.         Procedures           ED Course  ED Course as of 12/28/23 1304   Thu Dec 28, 2023   0758 Concern for mastitis.  Currently evaluated in the pit room.  We will evaluate with female chaperone in a more private room after lab work. [JM]   1042 Awaiting Labs [JM]   1301 Labs overall reassuring.  Lactation consultant did come down evaluate the patient and did help her express milk.  Pump at the bedside.  Given her history of MRSA I think it is reasonable to treat her with clindamycin as well.  Patient eager to leave.  Well aware the signs of worse condition.  All thankful and agreeable. [JM]      ED Course User Index  [JM] Nile Caicedo APRN                                             Medical Decision Making  Amount and/or Complexity of Data Reviewed  Labs: ordered.    Risk  Prescription drug management.        Final diagnoses:   Acute mastitis   Nonpurulent mastitis associated with lactation       ED Disposition  ED Disposition       ED Disposition   Discharge    Condition   Stable    Comment   --               Juli Esquivel MD  [address] [address]  [phone]    Schedule an appointment as soon as possible for a visit       [facility] EMERGENCY DEPARTMENT  [address] [facility] [id number] [phone]    If symptoms worsen         Medication List        New Prescriptions       clindamycin 300 MG capsule  Commonly known as: CLEOCIN  Take 1 capsule by mouth 4 (Four) Times a Day.               Where to Get Your Medications        These medications were sent to [facility] [id number] - PEGGY APARICIO - [address] - [phone]  - [phone]   [address] [place] [address]      Phone: [phone]   clindamycin 300 MG capsule            Nile Caicedo, APRN  12/28/23 1301

## 2024-01-02 ENCOUNTER — PATIENT OUTREACH (OUTPATIENT)
Dept: LABOR AND DELIVERY | Facility: HOSPITAL | Age: 24
End: 2024-01-02
Payer: COMMERCIAL

## 2024-01-02 NOTE — OUTREACH NOTE
Motherhood Connection  Postpartum Check-In    Questions/Answers      Flowsheet Row Responses   Visit Setting Telephone   Best Method for Contacting Cell   OB Discharge Note Reviewed  Reviewed   OB Discharge Navigator Reviewed  Reviewed   OB Discharge Medications Reviewed  Reviewed    discharged home with mother? Yes   Current Pain Levels 0-10 2   Pain Location Abdomen   Pain Description Cramping   How do you treat your pain? Medications   Verbalized Emotional State Acceptance   Family/Support Network Mother   Level of Involvement in Care Supportive, Interactive, Attentive   Do you feel comfortable in your relationship with your baby? Yes   Have members of your household adjusted to your baby? Yes   Is the baby's father supportive and/or involved with the baby? No   Other ,, not in contact   How does your partner feel about the baby? Other   Other see above   Do you feel safe at home, school and work? Yes   Are you in a relationship with someone who threatens you or hurts you? No   Do you have the resources to keep yourself and your baby healthy and safe? Yes   Lochia (per patient report) Rubra   Amount Scant   Number of pads per day 3   Lochia Odor None   Is patient breastfeeding? Yes, pumping   pumping - Left Breast Nontender   Pumping - Right Breast Nontender   Pumping - Left Nipple Intact   Pumping - Right Nipple Intact   Frequency Q2H post feeds   Pumping Quantity 1   Storage of Milk refrigerator   Postpartum Depression Screening Education Education Provided   Doctor Appointments: Education Provided   Breastfeeding Education Education Provided   Family Planning Education Education Provided   Postpartum Care Education Education Provided   S & S to report Education Provided   Followup Appointments Made Yes   Well Child Visit Appointments Made Yes   Appointment Date 24   Provider/Agency [facility]   Well Child Checkup Provider Name Dr. Jeffrey Sheehan in [place]   Well Child Check Up Date: 23    Did you complete the visit? Yes   Were there any specific concerns? Yes   Concerns: weight loss   Has additional follow-up with pediatrician or specialist been recommended? Yes   Follow-Up Recommended: Weight check, almost back up to birth weight   Umbilical Cord No reported signs or symptoms   Infant Feeding Method Breast   Breastfeeding Right   Time breastfeeding left: 15   Time breastfeeding right: 15   Frequency of feedings Q2H   Number of wet diapers x 24 hours 8   Last BM x 24 hours 6   Emesis (Unmeasured Occurence) minimal   What safe sleep surface is available? Bassinet   Are there stuffed animals, toys, pillows, quilts, blankets, wedges, positioners, bumpers or other loose bedding in the infant's sleeping environment? No   Where does the baby usually sleep? Bassinet   Does the baby ever share a sleep surface with a sibling, adult or pet? No   Does the baby ever share a sleep surface in a bed, couch, recliner or other? No   What position do you place your baby to sleep for naps? Back   What position do you place your baby to sleep at night Back   Are you and/or other caregivers smoking inside or outside the baby's home? No   Is the infant dressed appropiately for the temperature of the home? Yes   Do you use a clean, dry pacifier that is not attached to a string or stuffed animal? No            Review of Systems   Constitutional: Negative.    HENT: Negative.     Eyes: Negative.    Respiratory: Negative.     Cardiovascular: Negative.    Gastrointestinal: Negative.    Endocrine: Negative.    Genitourinary:  Positive for vaginal bleeding.   Musculoskeletal: Negative.    Skin: Negative.    Allergic/Immunologic: Negative.    Neurological: Negative.    Hematological: Negative.    Psychiatric/Behavioral: Negative.         Most Recent [place]  Depression Scale Score (EPDS)    Performed by a clinician: 0 (2024  4:17 PM)      5 Ps Screen  Complete    Yaneth is feeling well since going home. Minimal  pain and lochia WNL. No difficulties breastfeeding but has had issues with plugged duct/mastitis. States mood has been stable. Reports good family support. Has had no interaction with FOB.  Baby doing well with no concerns at follow up appointment as she is almost back up to birth weight.     No community resource needs at present. Encouraged contacting WIC to inform them she has delivered. Updated resources provided via [employer] message. Encouraged to look at both the MyChart message and in the Visits tab for educational items pertaining to her recovery in the AVS. RN from call center to f/u next week. Contact information provided. Encouraged to call with questions, concerns, or for support before then.    SUREKHA Tuttle RN  Maternity Nurse Navigator    1/2/2024, 16:53 EST

## 2024-01-07 ENCOUNTER — PATIENT OUTREACH (OUTPATIENT)
Dept: CALL CENTER | Facility: HOSPITAL | Age: 24
End: 2024-01-07
Payer: COMMERCIAL

## 2024-01-07 NOTE — OUTREACH NOTE
----- Message from Robb Zelaya sent at 9/25/2019  4:13 PM CDT -----  Contact: Georgina - Diana  Type:  Pharmacy Calling to Clarify an RX    Name of Caller:  Georgina  Pharmacy Name:  [id number] - [place], [place] - [address] AT [facility] [address]  [address] [facility]  [facility] [address]  Phone: [phone] Fax: [phone]  Prescription Name:  semaglutide (OZEMPIC) 0.25 mg or 0.5 mg(2 mg/1.5 mL) PnIj  What do they need to clarify?:  Authorization form  Best Call Back Number:  [phone] (Reference Number: [id number])  Additional Information:  Caller states the fax should have come from the above pharmacy. Thanks!   Motherhood Connection Survey      Flowsheet Row Responses   [facility] patient discharged from? [facility]   Week 1 attempt successful? Yes   Call start time 1711   Call end time 1715   Baby sex Girl   Dane discharged home with mother? Yes   Baby sex Girl   Delivery type Vaginal   Emotional state Acceptance   Family support Yes   Do you have all necessary resources to care for you and your baby?  Yes   Have members of your household adjusted to your baby? Yes   Did you have any problems with pre-eclampsia during this pregnancy? No   Did you have blood glucose issues during this pregnancy No   Lochia amount Light   Lochia per patient report Alba   Did you have an episiotomy/tear/abdominal incision? No   Feeding Method Breast   Frequency every 2   Duration 15 min   Pumping Yes   Storage of Milk refrigerator   Breast Condition No   Nipple Condition No   Nursing Interventions Supportive bra   Feeding tolerance Weight gain   Number of wet diapers x 24 hours 15   Last BM x 24 hours 3   Umbilical Cord No reported signs or symptoms   Where does the baby usually sleep? Bassinet   Are there stuffed animals, toys, pillows, quilts, blankets, wedges, positioners, bumpers or other loose bedding in the infant's sleeping environment? No   Does the baby ever share a sleep surface in a bed, couch, recliner or other? No   What position do you lay your baby down to sleep? Back   Are you and/or other caregivers smoking inside or outside the baby's home? No   Mom appointment comments: has appt this month   Baby appointment comments: has already been   Call completed? Yes   How satisfied were you with the Motherhood Connection Program? 5   Anyone you would like to recognize from your time in the Motherhood Connection Program Gricelda ABEL - Registered Nurse

## 2024-02-01 PROBLEM — Z34.90 ENCOUNTER FOR ELECTIVE INDUCTION OF LABOR: Status: RESOLVED | Noted: 2023-12-05 | Resolved: 2024-02-01

## 2024-02-01 PROBLEM — O99.891 BACK PAIN IN PREGNANCY: Status: RESOLVED | Noted: 2023-07-13 | Resolved: 2024-02-01

## 2024-02-01 PROBLEM — M54.9 BACK PAIN IN PREGNANCY: Status: RESOLVED | Noted: 2023-07-13 | Resolved: 2024-02-01

## 2024-02-01 PROBLEM — O23.41 URINARY TRACT INFECTION IN MOTHER DURING FIRST TRIMESTER OF PREGNANCY: Status: RESOLVED | Noted: 2023-06-10 | Resolved: 2024-02-01

## 2024-02-01 PROBLEM — Z86.16 HISTORY OF COVID-19: Status: RESOLVED | Noted: 2023-11-26 | Resolved: 2024-02-01

## 2024-02-01 PROBLEM — R10.9 ABDOMINAL PAIN: Status: RESOLVED | Noted: 2023-11-06 | Resolved: 2024-02-01

## 2024-02-01 PROBLEM — Z34.83 PRENATAL CARE, SUBSEQUENT PREGNANCY IN THIRD TRIMESTER: Status: RESOLVED | Noted: 2023-06-08 | Resolved: 2024-02-01

## 2024-02-02 ENCOUNTER — POSTPARTUM VISIT (OUTPATIENT)
Dept: OBSTETRICS AND GYNECOLOGY | Facility: CLINIC | Age: 24
End: 2024-02-02
Payer: COMMERCIAL

## 2024-02-02 VITALS
BODY MASS INDEX: 28.17 KG/M2 | HEIGHT: 63 IN | WEIGHT: 159 LBS | DIASTOLIC BLOOD PRESSURE: 80 MMHG | SYSTOLIC BLOOD PRESSURE: 112 MMHG

## 2024-02-02 NOTE — PROGRESS NOTES
"Subjective   Chief Complaint   Patient presents with    Postpartum Care     5w6d PP vaginal delivery     Yaneth Garcias is a 23 y.o. year old  presenting to be seen for her postpartum visit.  She had a vaginal delivery.  Her daughter is doing well.    Since delivery she has not been sexually active.  She does not have concerns about post-partum blues/depression.   High Bridge Score = 0  She is  breast feeding .  For ongoing contraception, her plans are undecided.    The following portions of the patient's history were reviewed and updated as appropriate:current medications and allergies    Social History    Tobacco Use      Smoking status: Never      Smokeless tobacco: Never      Review of Systems  Constitutional POS: nothing reported    NEG: anorexia or night sweats   Genitourinary POS: nothing reported    NEG: dysuria or hematuria      Gastointestinal POS: nothing reported    NEG: bloating, change in bowel habits, melena, or reflux symptoms   Breast POS: nothing reported    NEG: persistent breast lump, skin dimpling, or nipple discharge        Objective   /80 (BP Location: Left arm, Patient Position: Sitting, Cuff Size: Adult)   Ht 160 cm (63\")   Wt 72.1 kg (159 lb)   LMP 2023   Breastfeeding Yes   BMI 28.17 kg/m²     General: well developed; well nourished  no acute distress   Skin: No suspicious lesions seen   Thyroid: normal to inspection and palpation   Heart:  Not performed.   Lungs: breathing is unlabored   Breasts:  Examined in supine position  Symmetric without masses or skin dimpling  Nipples normal without inversion, lesions or discharge  There are no palpable axillary nodes  Patient is lactating.  No areas of erythema or tenderness noted and the nipples are normal   Abdomen: soft, non-tender; no masses  no umbilical or inguinal hernias are present  no hepato-splenomegaly   Pelvis: Clinical staff was present for exam  External genitalia:  normal appearance of the external " genitalia including Bartholin's and Deweese's glands.  :  urethral meatus normal;  Vaginal:  normal pink mucosa without prolapse or lesions.  Cervix:  normal appearance.  Uterus:  normal size, shape and consistency.  Adnexa:  normal bimanual exam of the adnexa.  Rectal:  digital rectal exam not performed; anus visually normal appearing.        Assessment   Normal 6 week postpartum exam     Plan   BC options reviewed and compared today:  she declines discussion as she is not currently with her partner.   Pap was not done today.  I explained to Yaneth that the recommendations for Pap smear interval in a low risk patient has lengthened to 3 years time.  I told Yaneth she still needs to be seen in our office yearly for a full physical including breast and pelvic exam.  The importance of keeping all planned follow-up and taking all medications as prescribed was emphasized.  Follow up for annual exam in ~ one year    No orders of the defined types were placed in this encounter.         This note was electronically signed.    Juli Esquivel MD  February 2, 2024

## 2024-02-02 NOTE — LETTER
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 Please contact the above number with any questions or concerns.            Sincerely,        Juli Esquivel MD    CC:   No Recipients

## 2024-02-02 NOTE — LETTER
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 Please call the above number with any questions or concerns.            Sincerely,        Juli Esquivel MD    CC:   No Recipients

## 2024-10-13 ENCOUNTER — HOSPITAL ENCOUNTER (EMERGENCY)
Facility: HOSPITAL | Age: 24
Discharge: HOME | End: 2024-10-13
Attending: EMERGENCY MEDICINE | Admitting: EMERGENCY MEDICINE
Payer: COMMERCIAL

## 2024-10-13 VITALS
SYSTOLIC BLOOD PRESSURE: 117 MMHG | TEMPERATURE: 98.3 F | DIASTOLIC BLOOD PRESSURE: 73 MMHG | BODY MASS INDEX: 29.06 KG/M2 | HEIGHT: 63 IN | HEART RATE: 88 BPM | WEIGHT: 164 LBS | OXYGEN SATURATION: 100 % | RESPIRATION RATE: 16 BRPM

## 2024-10-13 DIAGNOSIS — G43.809 OTHER MIGRAINE WITHOUT STATUS MIGRAINOSUS, NOT INTRACTABLE: Primary | ICD-10-CM

## 2024-10-13 DIAGNOSIS — J06.9 UPPER RESPIRATORY TRACT INFECTION, UNSPECIFIED TYPE: ICD-10-CM

## 2024-10-13 LAB
FLUAV RNA RESP QL NAA+PROBE: NOT DETECTED
FLUBV RNA RESP QL NAA+PROBE: NOT DETECTED
SARS-COV-2 RNA RESP QL NAA+PROBE: NOT DETECTED

## 2024-10-13 PROCEDURE — 25010000002 KETOROLAC TROMETHAMINE PER 15 MG: Performed by: PHYSICIAN ASSISTANT

## 2024-10-13 PROCEDURE — 99283 EMERGENCY DEPT VISIT LOW MDM: CPT

## 2024-10-13 PROCEDURE — 63710000001 ONDANSETRON ODT 4 MG TABLET DISPERSIBLE: Performed by: PHYSICIAN ASSISTANT

## 2024-10-13 PROCEDURE — 96372 THER/PROPH/DIAG INJ SC/IM: CPT

## 2024-10-13 PROCEDURE — 87636 SARSCOV2 & INF A&B AMP PRB: CPT | Performed by: PHYSICIAN ASSISTANT

## 2024-10-13 RX ORDER — ONDANSETRON 4 MG/1
4 TABLET, ORALLY DISINTEGRATING ORAL ONCE
Status: COMPLETED | OUTPATIENT
Start: 2024-10-13 | End: 2024-10-13

## 2024-10-13 RX ORDER — KETOROLAC TROMETHAMINE 30 MG/ML
60 INJECTION, SOLUTION INTRAMUSCULAR; INTRAVENOUS ONCE
Status: COMPLETED | OUTPATIENT
Start: 2024-10-13 | End: 2024-10-13

## 2024-10-13 RX ORDER — ACETAMINOPHEN 500 MG
1000 TABLET ORAL ONCE
Status: COMPLETED | OUTPATIENT
Start: 2024-10-13 | End: 2024-10-13

## 2024-10-13 RX ADMIN — ONDANSETRON 4 MG: 4 TABLET, ORALLY DISINTEGRATING ORAL at 17:22

## 2024-10-13 RX ADMIN — ACETAMINOPHEN 1000 MG: 500 TABLET ORAL at 17:22

## 2024-10-13 RX ADMIN — KETOROLAC TROMETHAMINE 60 MG: 30 INJECTION, SOLUTION INTRAMUSCULAR; INTRAVENOUS at 17:22

## 2024-10-13 NOTE — ED PROVIDER NOTES
Subjective   History of Present Illness  Pt is a 24 yo female presenting to ED with complaints of headache and congestion. Denies significant past medical hx. Pt complains of nasal congestion and nausea with migraine headache since yesterday. SHe has had light and sound sensitivity. She denies dizziness, vision change, neck pain, fever, cough, CP, SOB, abdominal pain or confusion. She took Excedrin earlier today. She has had similar headaches. She denies head injury. Denies sick contacts. Denies tobacco, drug or ETOH use.    History provided by:  Patient and medical records      Review of Systems   Constitutional:  Negative for fever.   HENT:  Positive for congestion. Negative for ear pain, sore throat and trouble swallowing.    Eyes:  Positive for photophobia. Negative for pain and visual disturbance.   Respiratory:  Negative for cough and shortness of breath.    Cardiovascular:  Negative for chest pain.   Gastrointestinal:  Positive for nausea. Negative for abdominal pain and vomiting.   Genitourinary:  Negative for difficulty urinating.   Musculoskeletal:  Negative for arthralgias, back pain and neck pain.   Neurological:  Positive for headaches. Negative for dizziness, syncope, speech difficulty, weakness and numbness.   Psychiatric/Behavioral:  Negative for confusion.        No past medical history on file.    No Known Allergies    Past Surgical History:   Procedure Laterality Date    TONSILLECTOMY      WISDOM TOOTH EXTRACTION         Family History   Problem Relation Age of Onset    Diabetes Maternal Grandmother     Breast cancer Maternal Grandmother     Hypertension Maternal Grandfather     Diabetes Maternal Grandfather     Breast cancer Maternal Great-Grandmother     Stroke Maternal Uncle     Colon cancer Neg Hx     Ovarian cancer Neg Hx     Pancreatic cancer Neg Hx     Prostate cancer Neg Hx        Social History     Socioeconomic History    Marital status:      Spouse name: Vega Roland  children: 1    Highest education level: Bachelor's degree (e.g., BA, AB, BS)   Tobacco Use    Smoking status: Never    Smokeless tobacco: Never   Vaping Use    Vaping status: Never Used   Substance and Sexual Activity    Alcohol use: Never    Drug use: Never    Sexual activity: Yes     Partners: Male           Objective   Physical Exam  Vitals reviewed.   Constitutional:       General: She is not in acute distress.  HENT:      Head: Atraumatic.      Nose: Congestion present.      Mouth/Throat:      Mouth: Mucous membranes are moist.   Eyes:      Extraocular Movements: Extraocular movements intact.      Conjunctiva/sclera: Conjunctivae normal.      Pupils: Pupils are equal, round, and reactive to light.   Cardiovascular:      Rate and Rhythm: Normal rate.   Pulmonary:      Effort: Pulmonary effort is normal. No respiratory distress.   Musculoskeletal:         General: Normal range of motion.      Cervical back: Normal range of motion and neck supple. No tenderness.   Skin:     General: Skin is warm.   Neurological:      General: No focal deficit present.      Mental Status: She is alert and oriented to person, place, and time.      Sensory: No sensory deficit.      Motor: No weakness.   Psychiatric:         Mood and Affect: Mood normal.         Behavior: Behavior normal.         Procedures           ED Course  ED Course as of 10/13/24 1924   Sun Oct 13, 2024   1646 I personally reviewed and interpreted vitals.  I personally reviewed and interpreted labs results and went over with patient.  [RT]   1646 SpO2: 100 %  RA [RT]   1646 BP: 117/73 [RT]   1646 Temp: 98.3 °F (36.8 °C) [RT]   1730 COVID19: Not Detected [RT]   1730 Influenza A PCR: Not Detected [RT]   1740 Patient resting and feeling much better after meds. She is agreeable with discharge.  [RT]      ED Course User Index  [RT] Gemma Moe PA      Recent Results (from the past 24 hours)   COVID-19 and FLU A/B PCR, 1 HR TAT - Swab, Nasopharynx    Collection  "Time: 10/13/24  5:29 PM    Specimen: Nasopharynx; Swab   Result Value Ref Range    COVID19 Not Detected Not Detected - Ref. Range    Influenza A PCR Not Detected Not Detected    Influenza B PCR Not Detected Not Detected     Note: In addition to lab results from this visit, the labs listed above may include labs taken at another facility or during a different encounter within the last 24 hours. Please correlate lab times with ED admission and discharge times for further clarification of the services performed during this visit.    No orders to display     Vitals:    10/13/24 1639   BP: 117/73   Pulse: 88   Resp: 16   Temp: 98.3 °F (36.8 °C)   TempSrc: Oral   SpO2: 100%   Weight: 74.4 kg (164 lb)   Height: 160 cm (63\")     Medications   ketorolac (TORADOL) injection 60 mg (60 mg Intramuscular Given 10/13/24 1722)   ondansetron ODT (ZOFRAN-ODT) disintegrating tablet 4 mg (4 mg Translingual Given 10/13/24 1722)   acetaminophen (TYLENOL) tablet 1,000 mg (1,000 mg Oral Given 10/13/24 1722)     ECG/EMG Results (last 24 hours)       ** No results found for the last 24 hours. **          No orders to display                                              Medical Decision Making  Pt is a 24 yo female presenting to ED with complaints of migraine, nausea and congestion. Patient resting and feeling much better after meds. Discussed results and tx plan.     DDx  Covid, Flu, Migraine, Meningitis, Viral illness, Pneumonia    Problems Addressed:  Other migraine without status migrainosus, not intractable: complicated acute illness or injury  Upper respiratory tract infection, unspecified type: complicated acute illness or injury    Amount and/or Complexity of Data Reviewed  External Data Reviewed: notes.     Details: Reviewed previous non ED visits including prior labs, imaging, available notes, medications, allergies and surgical hx.     Labs: ordered. Decision-making details documented in ED Course.    Risk  OTC drugs.  Prescription " drug management.        Final diagnoses:   Other migraine without status migrainosus, not intractable   Upper respiratory tract infection, unspecified type       ED Disposition  ED Disposition       ED Disposition   Discharge    Condition   Stable    Comment   --               PATIENT CONNECTION - [place] [id number]  [phone]    Call and establish a primary care doctor.    [facility] EMERGENCY DEPARTMENT  [address] [facility] [id number] [phone]    If symptoms worsen         Medication List      No changes were made to your prescriptions during this visit.            Gemma Moe PA  10/13/24 1924

## 2024-11-26 ENCOUNTER — OFFICE VISIT (OUTPATIENT)
Dept: FAMILY MEDICINE CLINIC | Facility: CLINIC | Age: 24
End: 2024-11-26
Payer: COMMERCIAL

## 2024-11-26 VITALS
HEART RATE: 59 BPM | SYSTOLIC BLOOD PRESSURE: 110 MMHG | BODY MASS INDEX: 28.35 KG/M2 | OXYGEN SATURATION: 100 % | DIASTOLIC BLOOD PRESSURE: 72 MMHG | WEIGHT: 160 LBS | HEIGHT: 63 IN

## 2024-11-26 DIAGNOSIS — Z23 NEED FOR HPV VACCINATION: ICD-10-CM

## 2024-11-26 DIAGNOSIS — Z13.1 DIABETES MELLITUS SCREENING: ICD-10-CM

## 2024-11-26 DIAGNOSIS — E66.3 OVERWEIGHT (BMI 25.0-29.9): ICD-10-CM

## 2024-11-26 DIAGNOSIS — Z00.00 GENERAL MEDICAL EXAM: Primary | ICD-10-CM

## 2024-11-26 PROCEDURE — 1159F MED LIST DOCD IN RCRD: CPT | Performed by: FAMILY MEDICINE

## 2024-11-26 PROCEDURE — 99385 PREV VISIT NEW AGE 18-39: CPT | Performed by: FAMILY MEDICINE

## 2024-11-26 PROCEDURE — 1160F RVW MEDS BY RX/DR IN RCRD: CPT | Performed by: FAMILY MEDICINE

## 2024-11-26 PROCEDURE — 2014F MENTAL STATUS ASSESS: CPT | Performed by: FAMILY MEDICINE

## 2024-11-26 PROCEDURE — 90471 IMMUNIZATION ADMIN: CPT | Performed by: FAMILY MEDICINE

## 2024-11-26 NOTE — LETTER
November 26, 2024     Patient: Yaneth Garcias   YOB: 2000   Date of Visit: 11/26/2024       To Whom It May Concern:    Yaneth Garcias is a patient under my medical care.    She was most recently seen on 11/26/2024 with no symptoms or findings to suggest any ongoing problems with depression.  She previously had an episode of an adjustment reaction when her parents  at age 16 (7 YEARS AGO), and did undergo family counseling and her symptoms did resolve. Her history does not support a past diagnosis of major depressive disorder.    She has not had any symptoms of depression or any features of underlying mental health problems.  She has never been on any mental health medications.    Since her initial application for  entry in 2020 she has not had any problems with depression or mental health problems.    I do not have any concerns about her physical or mental health that would affect her ability to perform under work-related stressors in the .    No further follow-up is required with our practice regarding her ability to fulfill the requirements of  service.              Sincerely,        Rupesh Lopez MD

## 2024-11-26 NOTE — PROGRESS NOTES
Chief Complaint  Checkup    Subjective    History of Present Illness:  Yaneth Garcias is a 23 y.o. female who presents today for checkup visit and to establish care.    She has been doing well since the birth of her child in December.  No longer breast-feeding.  She is not on any regular medications.    She will return to get fasting labs up-to-date and would like to consider HPV vaccination.  She would like an order placed for HPV vaccination if she decides that she would like this completed in the future.    No history of abnormal Pap smears.  Pap smear is up-to-date with women's health after her pregnancy.    She does have a 1-year-old and 5-year-old at home.    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    She did have a stressful episode at age 16 when her parents got  and did some brief counseling with resolution of symptoms.  She was incorrectly diagnosed at that time with a major depressive disorder and needs clarification of her lack of depression symptoms for  service.    She has never been on any medications for depression or mental health.  We did review together her PHQ-9 today and she did score a 0 with no signs for ongoing problems or depression.    We did discuss her past history 7 years ago when her parents got  and her symptoms were suggestive for an adjustment reaction but no evidence based upon symptoms for major depressive disorder.  She has not had any inpatient hospitalizations or any ongoing symptoms and unfortunately her prior misdiagnoses has affected her ability to enlist in the .  She does plan to enlist in the  and continue her education and become a physician assistant or physician!      PHQ-9 Depression Screening  Little interest or pleasure in doing things? Not at allNot at all   Feeling down, depressed, or hopeless? Not at allNo    PHQ-2 Total Score 0   Trouble  "falling or staying asleep, or sleeping too much?  No   Feeling tired or having little energy?  No   Poor appetite or overeating?  No   Feeling bad about yourself - or that you are a failure or have let yourself or your family down?  No   Trouble concentrating on things, such as reading the newspaper or watching television?  No   Moving or speaking so slowly that other people could have noticed? Or the opposite - being so fidgety or restless that you have been moving around a lot more than usual?  No   Thoughts that you would be better off dead, or of hurting yourself in some way?  No   PHQ-9 Total Score  0   If you checked off any problems, how difficult have these problems made it for you to do your work, take care of things at home, or get along with other people? Not difficult at allNot difficult at all        Objective   Vital Signs:   /72   Pulse 59   Ht 160 cm (63\")   Wt 72.6 kg (160 lb)   SpO2 100%   BMI 28.34 kg/m²     Review of Systems   Constitutional:  Negative for appetite change, chills and fever.   HENT:  Negative for hearing loss.    Eyes:  Negative for blurred vision.   Respiratory:  Negative for chest tightness.    Cardiovascular:  Negative for chest pain.   Gastrointestinal:  Negative for abdominal pain.   Musculoskeletal:  Negative for gait problem.   Skin:  Negative for rash.   Psychiatric/Behavioral:  Negative for agitation, behavioral problems, decreased concentration, dysphoric mood, sleep disturbance, suicidal ideas, negative for hyperactivity, depressed mood and stress. The patient is not nervous/anxious.        Past History:  Medical History: has no past medical history on file.   Surgical History: has a past surgical history that includes Tonsillectomy and Benedict tooth extraction.   Family History: family history includes Breast cancer in her maternal grandmother and maternal great-grandmother; Diabetes in her maternal grandfather and maternal grandmother; Hypertension in her " maternal grandfather; Stroke in her maternal uncle.   Social History: reports that she has never smoked. She has never used smokeless tobacco. She reports that she does not drink alcohol and does not use drugs.    No current outpatient medications on file.    Allergies: Patient has no known allergies.    Physical Exam  Constitutional:       Appearance: Normal appearance.   HENT:      Head: Normocephalic.      Right Ear: External ear normal.      Left Ear: External ear normal.      Nose: Nose normal.   Eyes:      Pupils: Pupils are equal, round, and reactive to light.   Cardiovascular:      Rate and Rhythm: Normal rate and regular rhythm.      Heart sounds: Normal heart sounds.   Pulmonary:      Effort: Pulmonary effort is normal.      Breath sounds: Normal breath sounds.   Musculoskeletal:         General: Normal range of motion.      Cervical back: Normal range of motion.   Skin:     General: Skin is warm and dry.   Neurological:      General: No focal deficit present.      Mental Status: She is alert.   Psychiatric:         Mood and Affect: Mood normal.         Behavior: Behavior normal.         Thought Content: Thought content normal.         Judgment: Judgment normal.          Result Review                   Assessment and Plan  Diagnoses and all orders for this visit:    1. General medical exam (Primary)  Assessment & Plan:  Discussed together health maintenance and screening along with vaccination options and healthy diet and exercise habits as part of the preventative counseling at their physical exam today.     Encouraged HPV vaccination.  She will consider.  Order placed for HPV vaccination if she decides to get this up-to-date.    She will return to get fasting lab work up-to-date.    We did review her past history and a letter was written in support of her desire to enter into the .  She has no symptoms at all to suggest depression or any other mental health issues and we did review together her  PHQ-9 with a score of 0.    With reviewing her past history it does appear that she was misdiagnosed during a period of an adjustment disorder when her parents got  at age 16 (7 years ago) and unfortunately this has caused her problems with  entry and I do feel this was made in error.    I do not have any concerns with her plan for  service.  She has no physical or mental health issues that would limit her ability to perform her duties in the       Orders:  -     CBC & Differential; Future  -     Comprehensive Metabolic Panel; Future  -     Lipid Panel; Future  -     TSH; Future  -     T4, Free; Future    2. Diabetes mellitus screening  -     Hemoglobin A1c; Future    3. Need for HPV vaccination  -     HPV Vaccine (HPV9); Future    4. Overweight (BMI 25.0-29.9)  Assessment & Plan:  Patient's (Body mass index is 28.34 kg/m².) indicates that they are overweight with health conditions that include none . Weight is improving with lifestyle modifications. BMI is above average; BMI management plan is completed. We discussed portion control and increasing exercise.                     Follow Up  Return in about 1 year (around 11/26/2025) for Annual physical.    Rupesh Lopez MD

## 2024-11-26 NOTE — ASSESSMENT & PLAN NOTE
Discussed together health maintenance and screening along with vaccination options and healthy diet and exercise habits as part of the preventative counseling at their physical exam today.     Encouraged HPV vaccination.  She will consider.  Order placed for HPV vaccination if she decides to get this up-to-date.    She will return to get fasting lab work up-to-date.    We did review her past history and a letter was written in support of her desire to enter into the .  She has no symptoms at all to suggest depression or any other mental health issues and we did review together her PHQ-9 with a score of 0.    With reviewing her past history it does appear that she was misdiagnosed during a period of an adjustment disorder when her parents got  at age 16 (7 years ago) and unfortunately this has caused her problems with  entry and I do feel this was made in error.    I do not have any concerns with her plan for  service.  She has no physical or mental health issues that would limit her ability to perform her duties in the

## 2024-11-27 ENCOUNTER — LAB (OUTPATIENT)
Dept: FAMILY MEDICINE CLINIC | Facility: CLINIC | Age: 24
End: 2024-11-27
Payer: COMMERCIAL

## 2024-11-27 PROCEDURE — 90651 9VHPV VACCINE 2/3 DOSE IM: CPT | Performed by: FAMILY MEDICINE

## 2024-11-28 LAB
ALBUMIN SERPL-MCNC: 4.5 G/DL (ref 4–5)
ALP SERPL-CCNC: 130 IU/L (ref 44–121)
ALT SERPL-CCNC: 8 IU/L (ref 0–32)
AST SERPL-CCNC: 14 IU/L (ref 0–40)
BASOPHILS # BLD AUTO: 0 X10E3/UL (ref 0–0.2)
BASOPHILS NFR BLD AUTO: 0 %
BILIRUB SERPL-MCNC: 0.2 MG/DL (ref 0–1.2)
BUN SERPL-MCNC: 9 MG/DL (ref 6–20)
BUN/CREAT SERPL: 13 (ref 9–23)
CALCIUM SERPL-MCNC: 9.4 MG/DL (ref 8.7–10.2)
CHLORIDE SERPL-SCNC: 105 MMOL/L (ref 96–106)
CHOLEST SERPL-MCNC: 133 MG/DL (ref 100–199)
CO2 SERPL-SCNC: 23 MMOL/L (ref 20–29)
CREAT SERPL-MCNC: 0.68 MG/DL (ref 0.57–1)
EGFRCR SERPLBLD CKD-EPI 2021: 125 ML/MIN/1.73
EOSINOPHIL # BLD AUTO: 0.1 X10E3/UL (ref 0–0.4)
EOSINOPHIL NFR BLD AUTO: 2 %
ERYTHROCYTE [DISTWIDTH] IN BLOOD BY AUTOMATED COUNT: 13.9 % (ref 11.7–15.4)
GLOBULIN SER CALC-MCNC: 2.6 G/DL (ref 1.5–4.5)
GLUCOSE SERPL-MCNC: 89 MG/DL (ref 70–99)
HBA1C MFR BLD: 5.7 % (ref 4.8–5.6)
HCT VFR BLD AUTO: 37.7 % (ref 34–46.6)
HDLC SERPL-MCNC: 32 MG/DL
HGB BLD-MCNC: 11.5 G/DL (ref 11.1–15.9)
IMM GRANULOCYTES # BLD AUTO: 0 X10E3/UL (ref 0–0.1)
IMM GRANULOCYTES NFR BLD AUTO: 0 %
LDLC SERPL CALC-MCNC: 85 MG/DL (ref 0–99)
LYMPHOCYTES # BLD AUTO: 1.9 X10E3/UL (ref 0.7–3.1)
LYMPHOCYTES NFR BLD AUTO: 37 %
MCH RBC QN AUTO: 23.9 PG (ref 26.6–33)
MCHC RBC AUTO-ENTMCNC: 30.5 G/DL (ref 31.5–35.7)
MCV RBC AUTO: 78 FL (ref 79–97)
MONOCYTES # BLD AUTO: 0.4 X10E3/UL (ref 0.1–0.9)
MONOCYTES NFR BLD AUTO: 8 %
NEUTROPHILS # BLD AUTO: 2.7 X10E3/UL (ref 1.4–7)
NEUTROPHILS NFR BLD AUTO: 53 %
PLATELET # BLD AUTO: 298 X10E3/UL (ref 150–450)
POTASSIUM SERPL-SCNC: 4.4 MMOL/L (ref 3.5–5.2)
PROT SERPL-MCNC: 7.1 G/DL (ref 6–8.5)
RBC # BLD AUTO: 4.81 X10E6/UL (ref 3.77–5.28)
SODIUM SERPL-SCNC: 141 MMOL/L (ref 134–144)
T4 FREE SERPL-MCNC: 1.3 NG/DL (ref 0.82–1.77)
TRIGL SERPL-MCNC: 83 MG/DL (ref 0–149)
TSH SERPL DL<=0.005 MIU/L-ACNC: 1.28 UIU/ML (ref 0.45–4.5)
VLDLC SERPL CALC-MCNC: 16 MG/DL (ref 5–40)
WBC # BLD AUTO: 5.1 X10E3/UL (ref 3.4–10.8)